# Patient Record
Sex: FEMALE | Race: WHITE | NOT HISPANIC OR LATINO | Employment: OTHER | URBAN - METROPOLITAN AREA
[De-identification: names, ages, dates, MRNs, and addresses within clinical notes are randomized per-mention and may not be internally consistent; named-entity substitution may affect disease eponyms.]

---

## 2023-10-09 ENCOUNTER — LAB REQUISITION (OUTPATIENT)
Dept: LAB | Facility: HOSPITAL | Age: 83
End: 2023-10-09
Payer: MEDICARE

## 2023-10-09 DIAGNOSIS — I10 ESSENTIAL (PRIMARY) HYPERTENSION: ICD-10-CM

## 2023-10-09 DIAGNOSIS — E10.9 TYPE 1 DIABETES MELLITUS WITHOUT COMPLICATIONS (MULTI): ICD-10-CM

## 2023-10-09 LAB
ALBUMIN SERPL BCP-MCNC: 3.5 G/DL (ref 3.4–5)
ALP SERPL-CCNC: 25 U/L (ref 33–136)
ALT SERPL W P-5'-P-CCNC: 12 U/L (ref 7–45)
ANION GAP SERPL CALC-SCNC: 13 MMOL/L (ref 10–20)
AST SERPL W P-5'-P-CCNC: 13 U/L (ref 9–39)
BILIRUB SERPL-MCNC: 0.5 MG/DL (ref 0–1.2)
BUN SERPL-MCNC: 36 MG/DL (ref 6–23)
CALCIUM SERPL-MCNC: 9.8 MG/DL (ref 8.6–10.3)
CHLORIDE SERPL-SCNC: 106 MMOL/L (ref 98–107)
CO2 SERPL-SCNC: 28 MMOL/L (ref 21–32)
CREAT SERPL-MCNC: 1.24 MG/DL (ref 0.5–1.05)
EST. AVERAGE GLUCOSE BLD GHB EST-MCNC: 157 MG/DL
GFR SERPL CREATININE-BSD FRML MDRD: 43 ML/MIN/1.73M*2
GLUCOSE SERPL-MCNC: 138 MG/DL (ref 74–99)
HBA1C MFR BLD: 7.1 %
POTASSIUM SERPL-SCNC: 4.9 MMOL/L (ref 3.5–5.3)
PROT SERPL-MCNC: 6.1 G/DL (ref 6.4–8.2)
SODIUM SERPL-SCNC: 142 MMOL/L (ref 136–145)

## 2023-10-09 PROCEDURE — 83036 HEMOGLOBIN GLYCOSYLATED A1C: CPT | Mod: OUT,CMCLAB,GEALAB | Performed by: REGISTERED NURSE

## 2023-10-09 PROCEDURE — 80053 COMPREHEN METABOLIC PANEL: CPT | Mod: OUT | Performed by: REGISTERED NURSE

## 2023-10-12 ENCOUNTER — LAB REQUISITION (OUTPATIENT)
Dept: LAB | Facility: HOSPITAL | Age: 83
End: 2023-10-12
Payer: MEDICARE

## 2023-10-12 DIAGNOSIS — E78.2 MIXED HYPERLIPIDEMIA: ICD-10-CM

## 2023-10-12 DIAGNOSIS — E10.9 TYPE 1 DIABETES MELLITUS WITHOUT COMPLICATIONS (MULTI): ICD-10-CM

## 2023-10-12 DIAGNOSIS — I10 ESSENTIAL (PRIMARY) HYPERTENSION: ICD-10-CM

## 2023-10-12 LAB
ALBUMIN SERPL BCP-MCNC: 3.7 G/DL (ref 3.4–5)
ALP SERPL-CCNC: 30 U/L (ref 33–136)
ALT SERPL W P-5'-P-CCNC: 12 U/L (ref 7–45)
ANION GAP SERPL CALC-SCNC: 14 MMOL/L (ref 10–20)
AST SERPL W P-5'-P-CCNC: 12 U/L (ref 9–39)
BILIRUB SERPL-MCNC: 0.6 MG/DL (ref 0–1.2)
BUN SERPL-MCNC: 34 MG/DL (ref 6–23)
CALCIUM SERPL-MCNC: 10.4 MG/DL (ref 8.6–10.3)
CHLORIDE SERPL-SCNC: 103 MMOL/L (ref 98–107)
CHOLEST SERPL-MCNC: 118 MG/DL (ref 0–199)
CHOLESTEROL/HDL RATIO: 2.3
CO2 SERPL-SCNC: 26 MMOL/L (ref 21–32)
CREAT SERPL-MCNC: 1.16 MG/DL (ref 0.5–1.05)
EST. AVERAGE GLUCOSE BLD GHB EST-MCNC: 151 MG/DL
GFR SERPL CREATININE-BSD FRML MDRD: 46 ML/MIN/1.73M*2
GLUCOSE SERPL-MCNC: 158 MG/DL (ref 74–99)
HBA1C MFR BLD: 6.9 %
HDLC SERPL-MCNC: 51.8 MG/DL
LDLC SERPL CALC-MCNC: 34 MG/DL
NON HDL CHOLESTEROL: 66 MG/DL (ref 0–149)
POTASSIUM SERPL-SCNC: 4.1 MMOL/L (ref 3.5–5.3)
PROT SERPL-MCNC: 6.7 G/DL (ref 6.4–8.2)
SODIUM SERPL-SCNC: 139 MMOL/L (ref 136–145)
TRIGL SERPL-MCNC: 163 MG/DL (ref 0–149)
VLDL: 33 MG/DL (ref 0–40)

## 2023-10-12 PROCEDURE — 83036 HEMOGLOBIN GLYCOSYLATED A1C: CPT | Mod: OUT,CMCLAB,GEALAB | Performed by: INTERNAL MEDICINE

## 2023-10-12 PROCEDURE — 80061 LIPID PANEL: CPT | Mod: OUT | Performed by: INTERNAL MEDICINE

## 2023-10-12 PROCEDURE — 80053 COMPREHEN METABOLIC PANEL: CPT | Mod: OUT | Performed by: INTERNAL MEDICINE

## 2024-01-23 ENCOUNTER — LAB REQUISITION (OUTPATIENT)
Dept: LAB | Facility: HOSPITAL | Age: 84
End: 2024-01-23
Payer: MEDICARE

## 2024-01-23 DIAGNOSIS — I10 ESSENTIAL (PRIMARY) HYPERTENSION: ICD-10-CM

## 2024-01-23 DIAGNOSIS — E10.9 TYPE 1 DIABETES MELLITUS WITHOUT COMPLICATIONS (MULTI): ICD-10-CM

## 2024-01-23 DIAGNOSIS — E78.2 MIXED HYPERLIPIDEMIA: ICD-10-CM

## 2024-01-23 LAB
ALBUMIN SERPL BCP-MCNC: 3.7 G/DL (ref 3.4–5)
ALP SERPL-CCNC: 29 U/L (ref 33–136)
ALT SERPL W P-5'-P-CCNC: 11 U/L (ref 7–45)
ANION GAP SERPL CALC-SCNC: 11 MMOL/L (ref 10–20)
AST SERPL W P-5'-P-CCNC: 14 U/L (ref 9–39)
BILIRUB SERPL-MCNC: 0.4 MG/DL (ref 0–1.2)
BUN SERPL-MCNC: 26 MG/DL (ref 6–23)
CALCIUM SERPL-MCNC: 10.2 MG/DL (ref 8.6–10.3)
CHLORIDE SERPL-SCNC: 101 MMOL/L (ref 98–107)
CHOLEST SERPL-MCNC: 109 MG/DL (ref 0–199)
CHOLESTEROL/HDL RATIO: 2.1
CO2 SERPL-SCNC: 30 MMOL/L (ref 21–32)
CREAT SERPL-MCNC: 1.11 MG/DL (ref 0.5–1.05)
EGFRCR SERPLBLD CKD-EPI 2021: 49 ML/MIN/1.73M*2
GLUCOSE SERPL-MCNC: 105 MG/DL (ref 74–99)
HDLC SERPL-MCNC: 52.9 MG/DL
LDLC SERPL CALC-MCNC: 36 MG/DL
NON HDL CHOLESTEROL: 56 MG/DL (ref 0–149)
POTASSIUM SERPL-SCNC: 5 MMOL/L (ref 3.5–5.3)
PROT SERPL-MCNC: 6.2 G/DL (ref 6.4–8.2)
SODIUM SERPL-SCNC: 137 MMOL/L (ref 136–145)
TRIGL SERPL-MCNC: 99 MG/DL (ref 0–149)
VLDL: 20 MG/DL (ref 0–40)

## 2024-01-23 PROCEDURE — 80061 LIPID PANEL: CPT | Mod: OUT | Performed by: REGISTERED NURSE

## 2024-01-23 PROCEDURE — 83036 HEMOGLOBIN GLYCOSYLATED A1C: CPT | Mod: OUT,GEALAB | Performed by: REGISTERED NURSE

## 2024-01-23 PROCEDURE — 80053 COMPREHEN METABOLIC PANEL: CPT | Mod: OUT | Performed by: REGISTERED NURSE

## 2024-01-24 LAB
EST. AVERAGE GLUCOSE BLD GHB EST-MCNC: 143 MG/DL
HBA1C MFR BLD: 6.6 %

## 2024-04-15 ENCOUNTER — LAB REQUISITION (OUTPATIENT)
Dept: LAB | Facility: HOSPITAL | Age: 84
End: 2024-04-15
Payer: MEDICARE

## 2024-04-15 DIAGNOSIS — E10.9 TYPE 1 DIABETES MELLITUS WITHOUT COMPLICATIONS (MULTI): ICD-10-CM

## 2024-04-15 DIAGNOSIS — I10 ESSENTIAL (PRIMARY) HYPERTENSION: ICD-10-CM

## 2024-04-15 LAB
ANION GAP SERPL CALC-SCNC: 15 MMOL/L (ref 10–20)
BUN SERPL-MCNC: 31 MG/DL (ref 6–23)
CALCIUM SERPL-MCNC: 10 MG/DL (ref 8.6–10.3)
CHLORIDE SERPL-SCNC: 104 MMOL/L (ref 98–107)
CO2 SERPL-SCNC: 32 MMOL/L (ref 21–32)
CREAT SERPL-MCNC: 1.13 MG/DL (ref 0.5–1.05)
EGFRCR SERPLBLD CKD-EPI 2021: 48 ML/MIN/1.73M*2
GLUCOSE SERPL-MCNC: 125 MG/DL (ref 74–99)
POTASSIUM SERPL-SCNC: 4.8 MMOL/L (ref 3.5–5.3)
SODIUM SERPL-SCNC: 146 MMOL/L (ref 136–145)

## 2024-04-15 PROCEDURE — 83036 HEMOGLOBIN GLYCOSYLATED A1C: CPT | Mod: OUT,GEALAB | Performed by: REGISTERED NURSE

## 2024-04-15 PROCEDURE — 80048 BASIC METABOLIC PNL TOTAL CA: CPT | Mod: OUT | Performed by: REGISTERED NURSE

## 2024-04-16 LAB
EST. AVERAGE GLUCOSE BLD GHB EST-MCNC: 148 MG/DL
HBA1C MFR BLD: 6.8 %

## 2024-07-22 ENCOUNTER — LAB REQUISITION (OUTPATIENT)
Dept: LAB | Facility: HOSPITAL | Age: 84
End: 2024-07-22
Payer: MEDICARE

## 2024-07-22 DIAGNOSIS — I10 ESSENTIAL (PRIMARY) HYPERTENSION: ICD-10-CM

## 2024-07-22 DIAGNOSIS — E10.9 TYPE 1 DIABETES MELLITUS WITHOUT COMPLICATIONS (MULTI): ICD-10-CM

## 2024-07-22 LAB
ANION GAP SERPL CALC-SCNC: 13 MMOL/L (ref 10–20)
BUN SERPL-MCNC: 29 MG/DL (ref 6–23)
CALCIUM SERPL-MCNC: 10.2 MG/DL (ref 8.6–10.3)
CHLORIDE SERPL-SCNC: 102 MMOL/L (ref 98–107)
CO2 SERPL-SCNC: 29 MMOL/L (ref 21–32)
CREAT SERPL-MCNC: 1.13 MG/DL (ref 0.5–1.05)
EGFRCR SERPLBLD CKD-EPI 2021: 47 ML/MIN/1.73M*2
EST. AVERAGE GLUCOSE BLD GHB EST-MCNC: 151 MG/DL
GLUCOSE SERPL-MCNC: 130 MG/DL (ref 74–99)
HBA1C MFR BLD: 6.9 %
POTASSIUM SERPL-SCNC: 4.4 MMOL/L (ref 3.5–5.3)
SODIUM SERPL-SCNC: 140 MMOL/L (ref 136–145)

## 2024-07-22 PROCEDURE — 83036 HEMOGLOBIN GLYCOSYLATED A1C: CPT | Mod: OUT,GEALAB | Performed by: REGISTERED NURSE

## 2024-07-22 PROCEDURE — 80048 BASIC METABOLIC PNL TOTAL CA: CPT | Mod: OUT | Performed by: REGISTERED NURSE

## 2024-08-28 ENCOUNTER — LAB REQUISITION (OUTPATIENT)
Dept: LAB | Facility: HOSPITAL | Age: 84
End: 2024-08-28
Payer: MEDICARE

## 2024-08-28 DIAGNOSIS — I10 ESSENTIAL (PRIMARY) HYPERTENSION: ICD-10-CM

## 2024-08-28 DIAGNOSIS — E10.9 TYPE 1 DIABETES MELLITUS WITHOUT COMPLICATIONS (MULTI): ICD-10-CM

## 2024-08-28 LAB
ALBUMIN SERPL BCP-MCNC: 3.6 G/DL (ref 3.4–5)
ALP SERPL-CCNC: 37 U/L (ref 33–136)
ALT SERPL W P-5'-P-CCNC: 11 U/L (ref 7–45)
ANION GAP SERPL CALC-SCNC: 14 MMOL/L (ref 10–20)
AST SERPL W P-5'-P-CCNC: 17 U/L (ref 9–39)
BILIRUB SERPL-MCNC: 0.6 MG/DL (ref 0–1.2)
BUN SERPL-MCNC: 37 MG/DL (ref 6–23)
CALCIUM SERPL-MCNC: 9.7 MG/DL (ref 8.6–10.3)
CHLORIDE SERPL-SCNC: 101 MMOL/L (ref 98–107)
CO2 SERPL-SCNC: 28 MMOL/L (ref 21–32)
CREAT SERPL-MCNC: 1.48 MG/DL (ref 0.5–1.05)
EGFRCR SERPLBLD CKD-EPI 2021: 34 ML/MIN/1.73M*2
EST. AVERAGE GLUCOSE BLD GHB EST-MCNC: 151 MG/DL
GLUCOSE SERPL-MCNC: 145 MG/DL (ref 74–99)
HBA1C MFR BLD: 6.9 %
POTASSIUM SERPL-SCNC: 4.7 MMOL/L (ref 3.5–5.3)
PROT SERPL-MCNC: 6.5 G/DL (ref 6.4–8.2)
SODIUM SERPL-SCNC: 138 MMOL/L (ref 136–145)

## 2024-08-28 PROCEDURE — 83036 HEMOGLOBIN GLYCOSYLATED A1C: CPT | Mod: OUT,GEALAB | Performed by: REGISTERED NURSE

## 2024-08-28 PROCEDURE — 80053 COMPREHEN METABOLIC PANEL: CPT | Mod: OUT | Performed by: REGISTERED NURSE

## 2024-09-12 ENCOUNTER — HOSPITAL ENCOUNTER (EMERGENCY)
Facility: HOSPITAL | Age: 84
Discharge: NURSING, REHAB OR LONG-TERM CARE | End: 2024-09-13
Attending: EMERGENCY MEDICINE
Payer: MEDICARE

## 2024-09-12 ENCOUNTER — APPOINTMENT (OUTPATIENT)
Dept: RADIOLOGY | Facility: HOSPITAL | Age: 84
End: 2024-09-12
Payer: MEDICARE

## 2024-09-12 VITALS
SYSTOLIC BLOOD PRESSURE: 176 MMHG | BODY MASS INDEX: 31.54 KG/M2 | RESPIRATION RATE: 16 BRPM | DIASTOLIC BLOOD PRESSURE: 72 MMHG | TEMPERATURE: 97.5 F | WEIGHT: 178 LBS | OXYGEN SATURATION: 100 % | HEART RATE: 85 BPM | HEIGHT: 63 IN

## 2024-09-12 DIAGNOSIS — S89.91XA INJURY OF RIGHT KNEE, INITIAL ENCOUNTER: Primary | ICD-10-CM

## 2024-09-12 DIAGNOSIS — N30.00 ACUTE CYSTITIS WITHOUT HEMATURIA: ICD-10-CM

## 2024-09-12 LAB
AMORPH CRY #/AREA UR COMP ASSIST: ABNORMAL /HPF
APPEARANCE UR: ABNORMAL
BACTERIA #/AREA URNS AUTO: ABNORMAL /HPF
BILIRUB UR STRIP.AUTO-MCNC: NEGATIVE MG/DL
COLOR UR: ABNORMAL
GLUCOSE UR STRIP.AUTO-MCNC: NORMAL MG/DL
KETONES UR STRIP.AUTO-MCNC: NEGATIVE MG/DL
LEUKOCYTE ESTERASE UR QL STRIP.AUTO: ABNORMAL
NITRITE UR QL STRIP.AUTO: NEGATIVE
PH UR STRIP.AUTO: 7 [PH]
PROT UR STRIP.AUTO-MCNC: ABNORMAL MG/DL
RBC # UR STRIP.AUTO: ABNORMAL /UL
RBC #/AREA URNS AUTO: ABNORMAL /HPF
SP GR UR STRIP.AUTO: 1.02
UROBILINOGEN UR STRIP.AUTO-MCNC: NORMAL MG/DL
WBC #/AREA URNS AUTO: >50 /HPF
WBC CLUMPS #/AREA URNS AUTO: ABNORMAL /HPF
YEAST BUDDING #/AREA UR COMP ASSIST: PRESENT /HPF

## 2024-09-12 PROCEDURE — 73700 CT LOWER EXTREMITY W/O DYE: CPT | Mod: RT

## 2024-09-12 PROCEDURE — 73590 X-RAY EXAM OF LOWER LEG: CPT | Mod: RT

## 2024-09-12 PROCEDURE — 73564 X-RAY EXAM KNEE 4 OR MORE: CPT | Mod: RIGHT SIDE | Performed by: RADIOLOGY

## 2024-09-12 PROCEDURE — 87086 URINE CULTURE/COLONY COUNT: CPT | Mod: GEALAB

## 2024-09-12 PROCEDURE — 73700 CT LOWER EXTREMITY W/O DYE: CPT | Mod: RIGHT SIDE | Performed by: STUDENT IN AN ORGANIZED HEALTH CARE EDUCATION/TRAINING PROGRAM

## 2024-09-12 PROCEDURE — 72170 X-RAY EXAM OF PELVIS: CPT

## 2024-09-12 PROCEDURE — 73552 X-RAY EXAM OF FEMUR 2/>: CPT | Mod: RT

## 2024-09-12 PROCEDURE — 73564 X-RAY EXAM KNEE 4 OR MORE: CPT | Mod: RT

## 2024-09-12 PROCEDURE — 73552 X-RAY EXAM OF FEMUR 2/>: CPT | Mod: RIGHT SIDE | Performed by: RADIOLOGY

## 2024-09-12 PROCEDURE — 2500000001 HC RX 250 WO HCPCS SELF ADMINISTERED DRUGS (ALT 637 FOR MEDICARE OP)

## 2024-09-12 PROCEDURE — 73590 X-RAY EXAM OF LOWER LEG: CPT | Mod: RIGHT SIDE | Performed by: RADIOLOGY

## 2024-09-12 PROCEDURE — 81001 URINALYSIS AUTO W/SCOPE: CPT

## 2024-09-12 PROCEDURE — 72170 X-RAY EXAM OF PELVIS: CPT | Mod: RIGHT SIDE | Performed by: RADIOLOGY

## 2024-09-12 PROCEDURE — 99284 EMERGENCY DEPT VISIT MOD MDM: CPT | Mod: 25

## 2024-09-12 RX ORDER — ACETAMINOPHEN 325 MG/1
650 TABLET ORAL ONCE
Status: COMPLETED | OUTPATIENT
Start: 2024-09-12 | End: 2024-09-12

## 2024-09-12 RX ORDER — CEPHALEXIN 500 MG/1
500 CAPSULE ORAL 2 TIMES DAILY
Qty: 10 CAPSULE | Refills: 0 | Status: SHIPPED | OUTPATIENT
Start: 2024-09-12 | End: 2024-09-17

## 2024-09-12 RX ORDER — CEPHALEXIN 500 MG/1
500 CAPSULE ORAL ONCE
Status: COMPLETED | OUTPATIENT
Start: 2024-09-12 | End: 2024-09-12

## 2024-09-12 ASSESSMENT — COLUMBIA-SUICIDE SEVERITY RATING SCALE - C-SSRS
2. HAVE YOU ACTUALLY HAD ANY THOUGHTS OF KILLING YOURSELF?: NO
1. IN THE PAST MONTH, HAVE YOU WISHED YOU WERE DEAD OR WISHED YOU COULD GO TO SLEEP AND NOT WAKE UP?: NO
6. HAVE YOU EVER DONE ANYTHING, STARTED TO DO ANYTHING, OR PREPARED TO DO ANYTHING TO END YOUR LIFE?: NO

## 2024-09-12 ASSESSMENT — PAIN SCALES - GENERAL: PAINLEVEL_OUTOF10: 8

## 2024-09-12 ASSESSMENT — LIFESTYLE VARIABLES
HAVE PEOPLE ANNOYED YOU BY CRITICIZING YOUR DRINKING: NO
EVER HAD A DRINK FIRST THING IN THE MORNING TO STEADY YOUR NERVES TO GET RID OF A HANGOVER: NO
EVER FELT BAD OR GUILTY ABOUT YOUR DRINKING: NO
HAVE YOU EVER FELT YOU SHOULD CUT DOWN ON YOUR DRINKING: NO
TOTAL SCORE: 0

## 2024-09-12 ASSESSMENT — PAIN DESCRIPTION - ORIENTATION: ORIENTATION: RIGHT

## 2024-09-12 ASSESSMENT — PAIN - FUNCTIONAL ASSESSMENT: PAIN_FUNCTIONAL_ASSESSMENT: 0-10

## 2024-09-12 ASSESSMENT — PAIN DESCRIPTION - LOCATION: LOCATION: LEG

## 2024-09-12 ASSESSMENT — PAIN DESCRIPTION - PAIN TYPE: TYPE: ACUTE PAIN

## 2024-09-12 NOTE — PROGRESS NOTES
The patient was seen by the midlevel/resident.  I have personally saw the patient and made/approved the management plan and take responsibility for the patient management.  I reviewed the EKG's (when done) and agree with the interpretation.  I have seen and examined the patient; agree with the workup, evaluation, MDM, and diagnosis.  The care plan has been discussed with the midlevel/resident; I have reviewed the note and agree with the documented findings.     Patient is complaining of right knee discomfort since a fall a week ago.  She had previous x-rays done at an outpatient facility that were negative per report.  We could not see the results.  X-rays here were performed and on my exam her pain seems to be focused on her knee she has fair range of motion at her hip although it does cause some knee pain.  Plan is to do a CT of the knee.  Not finding signs of hip fracture.  Patient may need further outpatient workup if etiology is not found.  CT did not show fracture patient stable be discharged encouraged to follow-up as outpatient.  I do not believe there is a hip etiology but we did discuss this as a possibility.  ED Course as of 09/12/24 1837   Thu Sep 12, 2024   1539 X-ray of the right hip, femur, knee and tib-fib showing no acute fracture or dislocation.  Will obtain CT to rule out occult fracture. [KR]   1759 Urinalysis with Reflex Culture and Microscopic(!)  Patient endorsing dark urine, will treat with course of Keflex, culture sent.  No signs of systemic infection, no flank tenderness or fever. [KR]   1812 CT knee right wo IV contrast  No evidence of tibial plateau fracture, patient with osteoarthritis noted.  Trace knee joint effusion. [KR]      ED Course User Index  [KR] Ivone Andres DO         Diagnoses as of 09/12/24 1837   Injury of right knee, initial encounter   Acute cystitis without hematuria     Kal Arzate MD

## 2024-09-12 NOTE — DISCHARGE INSTRUCTIONS
You were seen after a fall for knee pain.  Your x-rays and CT did not show any fractures however you may still have some ligament or meniscus injury.  We have sent referral for orthopedics.  We sent you a prescription for urinary tract infection.

## 2024-09-12 NOTE — ED TRIAGE NOTES
'patient from [facility] with c/o increased right knee pain difficulty ambulating after slipping out of a chair a week ago and landing on right knee. Patient had xrays done of knee which  came back negative. Patient reporting increased pain and unable to use her walker. Patient is A&Ox4

## 2024-09-13 LAB — HOLD SPECIMEN: NORMAL

## 2024-09-15 LAB — BACTERIA UR CULT: ABNORMAL

## 2024-09-17 LAB — BACTERIA UR CULT: ABNORMAL

## 2024-09-19 ENCOUNTER — TELEPHONE (OUTPATIENT)
Dept: PHARMACY | Facility: HOSPITAL | Age: 84
End: 2024-09-19
Payer: MEDICARE

## 2024-09-19 NOTE — PROGRESS NOTES
EDPD Note: Lab/Chart Reviewed    Reviewed Ms. Donna J Gonda 's chart regarding a positive urine culture that was taken during their recent emergency room visit. The patient was transferred back to their rehab/LTC facility .Therefore, I have faxed this information to [facility] at fax number [phone] .    Susceptibility data from last 90 days.  Collected Specimen Info Organism Amoxicillin/Clavulanate Ampicillin Ampicillin/Sulbactam Cefazolin Cefazolin (uncomplicated UTIs only) Ceftriaxone Ciprofloxacin Gentamicin Nitrofurantoin Piperacillin/Tazobactam Tobramycin   09/12/24 Urine from Clean Catch/Voided Escherichia coli  I  R  R  R  S  S  R  R  S  S  S     Collected Specimen Info Organism Trimethoprim/Sulfamethoxazole   09/12/24 Urine from Clean Catch/Voided Escherichia coli  S       No further follow up needed from EDPD Team.     Juanpablo Romero RPh

## 2024-10-07 ENCOUNTER — HOSPITAL ENCOUNTER (OUTPATIENT)
Dept: RADIOLOGY | Facility: HOSPITAL | Age: 84
Discharge: HOME | End: 2024-10-07
Payer: MEDICARE

## 2024-10-07 DIAGNOSIS — M89.8X5 OTHER SPECIFIED DISORDERS OF BONE, THIGH: ICD-10-CM

## 2024-10-07 PROCEDURE — 73718 MRI LOWER EXTREMITY W/O DYE: CPT | Mod: RIGHT SIDE | Performed by: STUDENT IN AN ORGANIZED HEALTH CARE EDUCATION/TRAINING PROGRAM

## 2024-10-07 PROCEDURE — 73718 MRI LOWER EXTREMITY W/O DYE: CPT | Mod: RT

## 2024-10-15 ENCOUNTER — OFFICE VISIT (OUTPATIENT)
Dept: ORTHOPEDIC SURGERY | Facility: CLINIC | Age: 84
End: 2024-10-15
Payer: MEDICARE

## 2024-10-15 VITALS — WEIGHT: 178 LBS | HEIGHT: 63 IN | BODY MASS INDEX: 31.54 KG/M2

## 2024-10-15 DIAGNOSIS — S89.91XA INJURY OF RIGHT KNEE, INITIAL ENCOUNTER: ICD-10-CM

## 2024-10-15 PROCEDURE — 99213 OFFICE O/P EST LOW 20 MIN: CPT | Performed by: PHYSICIAN ASSISTANT

## 2024-10-15 RX ORDER — METFORMIN HYDROCHLORIDE 500 MG/1
TABLET ORAL
COMMUNITY
Start: 2024-07-28

## 2024-10-15 RX ORDER — VALSARTAN AND HYDROCHLOROTHIAZIDE 160; 12.5 MG/1; MG/1
TABLET, FILM COATED ORAL
COMMUNITY
Start: 2023-04-25

## 2024-10-15 RX ORDER — GABAPENTIN 300 MG/1
1 CAPSULE ORAL EVERY 12 HOURS
COMMUNITY
Start: 2024-08-26

## 2024-10-15 RX ORDER — APIXABAN 5 MG/1
5 TABLET, FILM COATED ORAL 2 TIMES DAILY
COMMUNITY
Start: 2023-05-25

## 2024-10-15 RX ORDER — LOVASTATIN 20 MG/1
20 TABLET ORAL DAILY
COMMUNITY
Start: 2023-04-25

## 2024-10-15 RX ORDER — SEMAGLUTIDE 0.68 MG/ML
INJECTION, SOLUTION SUBCUTANEOUS
COMMUNITY
Start: 2024-04-07

## 2024-10-15 RX ORDER — FAMOTIDINE 20 MG/1
TABLET, FILM COATED ORAL 2 TIMES DAILY
COMMUNITY
Start: 2024-07-30

## 2024-10-15 RX ORDER — METOPROLOL SUCCINATE 50 MG/1
TABLET, EXTENDED RELEASE ORAL
COMMUNITY
Start: 2019-07-30

## 2024-10-15 RX ORDER — ACETAMINOPHEN 500 MG/1
CAPSULE, LIQUID FILLED ORAL EVERY 8 HOURS PRN
COMMUNITY

## 2024-10-15 ASSESSMENT — PAIN - FUNCTIONAL ASSESSMENT: PAIN_FUNCTIONAL_ASSESSMENT: 0-10

## 2024-10-15 ASSESSMENT — PAIN DESCRIPTION - DESCRIPTORS: DESCRIPTORS: ACHING

## 2024-10-15 ASSESSMENT — PAIN SCALES - GENERAL: PAINLEVEL_OUTOF10: 7

## 2024-10-16 NOTE — PROGRESS NOTES
History of Present Illness  Donna J Gonda is a 84 y.o.s female presenting for evaluation of right knee pain after a fall about one month ago. Pt was walking when she slipped and the right leg folded under her left. Had immediate pain in the knee. Since then she has had continued moderate pain in the knee that is diffuse throughout. Sx increased with WB and improved with rest. Has had no swelling, redness, or increased warmth. Has been taking OTC medications without relief. No other complaints or concerns.      History reviewed. No pertinent past medical history.    Medication Documentation Review Audit       Reviewed by Kai Wayne on 10/15/24 at 1425      Medication Order Taking? Sig Documenting Provider Last Dose Status   acetaminophen (Tylenol) 500 mg capsule [id number]  Take by mouth every 8 hours if needed. Historical Provider, MD  Active   Eliquis 5 mg tablet [id number] Yes Take 1 tablet (5 mg) by mouth 2 times a day. Historical Provider, MD  Active   famotidine (Pepcid) 20 mg tablet [id number] Yes Take by mouth twice a day. Historical Provider, MD  Active   gabapentin (Neurontin) 300 mg capsule [id number] Yes 1 capsule (300 mg) every 12 hours. Historical Provider, MD  Active   lovastatin (Mevacor) 20 mg tablet [id number] Yes Take 1 tablet (20 mg) by mouth once daily. Historical Provider, MD  Active   metFORMIN (Glucophage) 500 mg tablet [id number] Yes  Historical Provider, MD  Active   metoprolol succinate XL (Toprol-XL) 50 mg 24 hr tablet [id number] Yes  Historical Provider, MD  Active   Ozempic 0.25 mg or 0.5 mg (2 mg/3 mL) pen injector [id number] Yes  Historical Provider, MD  Active   valsartan-hydrochlorothiazide (Diovan-HCT) 160-12.5 mg tablet [id number] Yes  Historical Provider, MD  Active                    No Known Allergies    Social History     Socioeconomic History    Marital status: Single     Spouse name: Not on file    Number of children: Not on file    Years of education: Not on file    Highest  education level: Not on file   Occupational History    Not on file   Tobacco Use    Smoking status: Not on file    Smokeless tobacco: Not on file   Substance and Sexual Activity    Alcohol use: Not on file    Drug use: Not on file    Sexual activity: Not on file   Other Topics Concern    Not on file   Social History Narrative    Not on file     Social Drivers of Health     Financial Resource Strain: Not on file   Food Insecurity: Not on file   Transportation Needs: Not on file   Physical Activity: Not on file   Stress: Not on file   Social Connections: Not on file   Intimate Partner Violence: Not on file   Housing Stability: Not on file       History reviewed. No pertinent surgical history.     Review of Systems   30 point ROS reviewed and negative other than as listed in the HPI.      Exam  Gen: The pt is A&Ox3, NAD, and appear state age and weight  Psychiatric: mood and affect are appropriate   Eyes: sclera are white, EOM grossly intact  ENT: MMM  Neck: supple, thyroid is midline  Respiratory: respirations are nonlabored, chest rise symmetric  CV: rate is regular by palpation of distal pulses  Abdomen: nondistended   Integument: no obvious cutaneous lesions noted. No signs of lymphangitis. No signs of systemic edema.   MSK:  Musculoskeletal examination of the right knee demonstrate no abrasions and no effusion. Knee range of motion is painful but demonstrates full extension to beyond 90° of flexion. The knee is stable to varus and valgus stress at zero and 30° of flexion. Lachman's test and posterior drawer are negative. Medial and lateral Kimberly's tests are negative. Patellar apprehension is negative. Patellar grind test is negative. Sensation is intact to light touch in the tibial, sural, saphenous, superficial peroneal, and deep peroneal nerve distributions. Foot is warm and well-perfused.     Imaging:  I personally reviewed multiple views of the  R knee  including XR and CT and notable for advanced  degenerative changes but no acute fx or dislocation     Assessment:  R knee sprain; DJD    Plan:  I reviewed her imaging in detail with her. She has signs of arthritis on imaging but no acute fx or dislocation. Her exam is not concerning for ligamentous injury. I think at this time she would benefit from a cortisone shot, but due to her body habitus would best be treated with US guided. She has a provider she see routinely for back injections and will reach out about a knee injection, but could also see one of our sports medicine physicians. She is in PT and will continue with her rehab exercises. No further follow up with me needed; can follow up with joints specialist as needed.   Natural history reviewed. All of the pt questions/concerns addressed and they are in agreement with the plan.

## 2024-10-29 ENCOUNTER — APPOINTMENT (OUTPATIENT)
Dept: ORTHOPEDIC SURGERY | Facility: CLINIC | Age: 84
End: 2024-10-29

## 2025-01-02 ENCOUNTER — LAB REQUISITION (OUTPATIENT)
Dept: LAB | Facility: HOSPITAL | Age: 85
End: 2025-01-02
Payer: MEDICARE

## 2025-01-02 DIAGNOSIS — D64.9 ANEMIA, UNSPECIFIED: ICD-10-CM

## 2025-01-02 DIAGNOSIS — I10 ESSENTIAL (PRIMARY) HYPERTENSION: ICD-10-CM

## 2025-01-02 DIAGNOSIS — E78.2 MIXED HYPERLIPIDEMIA: ICD-10-CM

## 2025-01-02 LAB
ANION GAP SERPL CALC-SCNC: 13 MMOL/L (ref 10–20)
BUN SERPL-MCNC: 33 MG/DL (ref 6–23)
CALCIUM SERPL-MCNC: 9.9 MG/DL (ref 8.6–10.3)
CHLORIDE SERPL-SCNC: 102 MMOL/L (ref 98–107)
CHOLEST SERPL-MCNC: 127 MG/DL (ref 0–199)
CHOLESTEROL/HDL RATIO: 2.2
CO2 SERPL-SCNC: 29 MMOL/L (ref 21–32)
CREAT SERPL-MCNC: 1.2 MG/DL (ref 0.5–1.05)
EGFRCR SERPLBLD CKD-EPI 2021: 44 ML/MIN/1.73M*2
ERYTHROCYTE [DISTWIDTH] IN BLOOD BY AUTOMATED COUNT: 13.1 % (ref 11.5–14.5)
GLUCOSE SERPL-MCNC: 105 MG/DL (ref 74–99)
HCT VFR BLD AUTO: 33.7 % (ref 36–46)
HDLC SERPL-MCNC: 58.3 MG/DL
HGB BLD-MCNC: 10.6 G/DL (ref 12–16)
LDLC SERPL CALC-MCNC: 53 MG/DL
MCH RBC QN AUTO: 30.2 PG (ref 26–34)
MCHC RBC AUTO-ENTMCNC: 31.5 G/DL (ref 32–36)
MCV RBC AUTO: 96 FL (ref 80–100)
NON HDL CHOLESTEROL: 69 MG/DL (ref 0–149)
NRBC BLD-RTO: 0 /100 WBCS (ref 0–0)
PLATELET # BLD AUTO: 219 X10*3/UL (ref 150–450)
POTASSIUM SERPL-SCNC: 4.7 MMOL/L (ref 3.5–5.3)
RBC # BLD AUTO: 3.51 X10*6/UL (ref 4–5.2)
SODIUM SERPL-SCNC: 139 MMOL/L (ref 136–145)
TRIGL SERPL-MCNC: 78 MG/DL (ref 0–149)
VLDL: 16 MG/DL (ref 0–40)
WBC # BLD AUTO: 5.8 X10*3/UL (ref 4.4–11.3)

## 2025-01-02 PROCEDURE — 85027 COMPLETE CBC AUTOMATED: CPT | Mod: OUT | Performed by: REGISTERED NURSE

## 2025-01-02 PROCEDURE — 82947 ASSAY GLUCOSE BLOOD QUANT: CPT | Mod: OUT | Performed by: REGISTERED NURSE

## 2025-01-02 PROCEDURE — 84478 ASSAY OF TRIGLYCERIDES: CPT | Mod: OUT | Performed by: REGISTERED NURSE

## 2025-01-10 ENCOUNTER — LAB REQUISITION (OUTPATIENT)
Dept: LAB | Facility: HOSPITAL | Age: 85
DRG: 444 | End: 2025-01-10
Payer: MEDICARE

## 2025-01-10 DIAGNOSIS — I10 ESSENTIAL (PRIMARY) HYPERTENSION: ICD-10-CM

## 2025-01-10 DIAGNOSIS — I50.9 HEART FAILURE, UNSPECIFIED: ICD-10-CM

## 2025-01-10 DIAGNOSIS — N39.0 URINARY TRACT INFECTION, SITE NOT SPECIFIED: ICD-10-CM

## 2025-01-10 DIAGNOSIS — E83.09: ICD-10-CM

## 2025-01-10 DIAGNOSIS — E83.40 DISORDERS OF MAGNESIUM METABOLISM, UNSPECIFIED: ICD-10-CM

## 2025-01-10 LAB
ANION GAP SERPL CALC-SCNC: 13 MMOL/L (ref 10–20)
APPEARANCE UR: CLEAR
BILIRUB UR STRIP.AUTO-MCNC: NEGATIVE MG/DL
BNP SERPL-MCNC: 134 PG/ML (ref 0–99)
BUN SERPL-MCNC: 29 MG/DL (ref 6–23)
CALCIUM SERPL-MCNC: 9.9 MG/DL (ref 8.6–10.3)
CHLORIDE SERPL-SCNC: 102 MMOL/L (ref 98–107)
CO2 SERPL-SCNC: 30 MMOL/L (ref 21–32)
COLOR UR: COLORLESS
CREAT SERPL-MCNC: 1.08 MG/DL (ref 0.5–1.05)
EGFRCR SERPLBLD CKD-EPI 2021: 50 ML/MIN/1.73M*2
GLUCOSE SERPL-MCNC: 102 MG/DL (ref 74–99)
GLUCOSE UR STRIP.AUTO-MCNC: NORMAL MG/DL
KETONES UR STRIP.AUTO-MCNC: NEGATIVE MG/DL
LEUKOCYTE ESTERASE UR QL STRIP.AUTO: NEGATIVE
MAGNESIUM SERPL-MCNC: 1.8 MG/DL (ref 1.6–2.4)
NITRITE UR QL STRIP.AUTO: NEGATIVE
PH UR STRIP.AUTO: 7 [PH]
PHOSPHATE SERPL-MCNC: 3.8 MG/DL (ref 2.5–4.9)
POTASSIUM SERPL-SCNC: 4.3 MMOL/L (ref 3.5–5.3)
PROT UR STRIP.AUTO-MCNC: NEGATIVE MG/DL
RBC # UR STRIP.AUTO: NEGATIVE /UL
SODIUM SERPL-SCNC: 141 MMOL/L (ref 136–145)
SP GR UR STRIP.AUTO: 1.01
UROBILINOGEN UR STRIP.AUTO-MCNC: NORMAL MG/DL

## 2025-01-10 PROCEDURE — 83735 ASSAY OF MAGNESIUM: CPT | Mod: OUT | Performed by: REGISTERED NURSE

## 2025-01-10 PROCEDURE — 83880 ASSAY OF NATRIURETIC PEPTIDE: CPT | Mod: OUT | Performed by: REGISTERED NURSE

## 2025-01-10 PROCEDURE — 87086 URINE CULTURE/COLONY COUNT: CPT | Mod: OUT,GEALAB | Performed by: REGISTERED NURSE

## 2025-01-10 PROCEDURE — 80048 BASIC METABOLIC PNL TOTAL CA: CPT | Mod: OUT | Performed by: REGISTERED NURSE

## 2025-01-10 PROCEDURE — 81003 URINALYSIS AUTO W/O SCOPE: CPT | Mod: OUT | Performed by: REGISTERED NURSE

## 2025-01-10 PROCEDURE — 84100 ASSAY OF PHOSPHORUS: CPT | Mod: OUT | Performed by: REGISTERED NURSE

## 2025-01-11 ENCOUNTER — APPOINTMENT (OUTPATIENT)
Dept: RADIOLOGY | Facility: HOSPITAL | Age: 85
DRG: 444 | End: 2025-01-11
Payer: MEDICARE

## 2025-01-11 ENCOUNTER — HOSPITAL ENCOUNTER (INPATIENT)
Facility: HOSPITAL | Age: 85
End: 2025-01-11
Attending: STUDENT IN AN ORGANIZED HEALTH CARE EDUCATION/TRAINING PROGRAM | Admitting: INTERNAL MEDICINE
Payer: MEDICARE

## 2025-01-11 ENCOUNTER — APPOINTMENT (OUTPATIENT)
Dept: CARDIOLOGY | Facility: HOSPITAL | Age: 85
DRG: 444 | End: 2025-01-11
Payer: MEDICARE

## 2025-01-11 DIAGNOSIS — R41.82 ALTERED MENTAL STATUS, UNSPECIFIED ALTERED MENTAL STATUS TYPE: Primary | ICD-10-CM

## 2025-01-11 DIAGNOSIS — N17.9 AKI (ACUTE KIDNEY INJURY) (CMS-HCC): ICD-10-CM

## 2025-01-11 DIAGNOSIS — L03.90 CELLULITIS, UNSPECIFIED CELLULITIS SITE: ICD-10-CM

## 2025-01-11 DIAGNOSIS — K83.8 COMMON BILE DUCT DILATION: ICD-10-CM

## 2025-01-11 PROBLEM — N18.32 CHRONIC KIDNEY DISEASE, STAGE 3B (MULTI): Status: ACTIVE | Noted: 2025-01-11

## 2025-01-11 PROBLEM — M46.1 SACROILIITIS (CMS-HCC): Status: ACTIVE | Noted: 2023-08-14

## 2025-01-11 PROBLEM — M51.17 INTERVERTEBRAL DISC DISORDER WITH RADICULOPATHY OF LUMBOSACRAL REGION: Status: ACTIVE | Noted: 2024-01-04

## 2025-01-11 PROBLEM — E78.5 HYPERLIPIDEMIA: Status: ACTIVE | Noted: 2025-01-11

## 2025-01-11 PROBLEM — I82.409 DVT (DEEP VENOUS THROMBOSIS) (MULTI): Status: ACTIVE | Noted: 2025-01-11

## 2025-01-11 PROBLEM — M51.16 DISPLACEMENT OF LUMBAR INTERVERTEBRAL DISC WITH RADICULOPATHY: Status: ACTIVE | Noted: 2023-06-08

## 2025-01-11 PROBLEM — G89.29 CHRONIC BILATERAL LOW BACK PAIN WITH LEFT-SIDED SCIATICA: Status: ACTIVE | Noted: 2023-06-08

## 2025-01-11 PROBLEM — E66.01 MORBID (SEVERE) OBESITY DUE TO EXCESS CALORIES (MULTI): Status: ACTIVE | Noted: 2025-01-11

## 2025-01-11 PROBLEM — M48.062 SPINAL STENOSIS, LUMBAR REGION, WITH NEUROGENIC CLAUDICATION: Status: ACTIVE | Noted: 2023-06-08

## 2025-01-11 PROBLEM — M54.42 CHRONIC BILATERAL LOW BACK PAIN WITH LEFT-SIDED SCIATICA: Status: ACTIVE | Noted: 2023-06-08

## 2025-01-11 PROBLEM — E11.22 TYPE 2 DIABETES MELLITUS WITH DIABETIC CHRONIC KIDNEY DISEASE: Status: ACTIVE | Noted: 2025-01-11

## 2025-01-11 LAB
ALBUMIN SERPL BCP-MCNC: 3 G/DL (ref 3.4–5)
ALBUMIN SERPL BCP-MCNC: 3.3 G/DL (ref 3.4–5)
ALP SERPL-CCNC: 44 U/L (ref 33–136)
ALP SERPL-CCNC: 52 U/L (ref 33–136)
ALT SERPL W P-5'-P-CCNC: 14 U/L (ref 7–45)
ALT SERPL W P-5'-P-CCNC: 17 U/L (ref 7–45)
AMMONIA PLAS-SCNC: 16 UMOL/L (ref 16–53)
AMPHETAMINES UR QL SCN: NORMAL
AMYLASE SERPL-CCNC: 36 U/L (ref 29–103)
ANION GAP SERPL CALC-SCNC: 12 MMOL/L (ref 10–20)
ANION GAP SERPL CALC-SCNC: 14 MMOL/L (ref 10–20)
APAP SERPL-MCNC: <10 UG/ML
APPEARANCE UR: CLEAR
AST SERPL W P-5'-P-CCNC: 20 U/L (ref 9–39)
AST SERPL W P-5'-P-CCNC: 24 U/L (ref 9–39)
BARBITURATES UR QL SCN: NORMAL
BASE EXCESS BLDV CALC-SCNC: 6.6 MMOL/L (ref -2–3)
BASOPHILS # BLD AUTO: 0.04 X10*3/UL (ref 0–0.1)
BASOPHILS NFR BLD AUTO: 0.6 %
BENZODIAZ UR QL SCN: NORMAL
BILIRUB SERPL-MCNC: 0.4 MG/DL (ref 0–1.2)
BILIRUB SERPL-MCNC: 0.5 MG/DL (ref 0–1.2)
BILIRUB UR STRIP.AUTO-MCNC: NEGATIVE MG/DL
BNP SERPL-MCNC: 51 PG/ML (ref 0–99)
BODY TEMPERATURE: ABNORMAL
BUN SERPL-MCNC: 31 MG/DL (ref 6–23)
BUN SERPL-MCNC: 36 MG/DL (ref 6–23)
BZE UR QL SCN: NORMAL
CALCIUM SERPL-MCNC: 9.2 MG/DL (ref 8.6–10.3)
CALCIUM SERPL-MCNC: 9.7 MG/DL (ref 8.6–10.3)
CANNABINOIDS UR QL SCN: NORMAL
CARDIAC TROPONIN I PNL SERPL HS: 12 NG/L (ref 0–13)
CARDIAC TROPONIN I PNL SERPL HS: 9 NG/L (ref 0–13)
CHLORIDE SERPL-SCNC: 98 MMOL/L (ref 98–107)
CHLORIDE SERPL-SCNC: 99 MMOL/L (ref 98–107)
CK SERPL-CCNC: 192 U/L (ref 0–215)
CO2 SERPL-SCNC: 30 MMOL/L (ref 21–32)
CO2 SERPL-SCNC: 31 MMOL/L (ref 21–32)
COLOR UR: COLORLESS
CREAT SERPL-MCNC: 1.24 MG/DL (ref 0.5–1.05)
CREAT SERPL-MCNC: 1.35 MG/DL (ref 0.5–1.05)
CRP SERPL-MCNC: 6.96 MG/DL
CRP SERPL-MCNC: 8.29 MG/DL
EGFRCR SERPLBLD CKD-EPI 2021: 38 ML/MIN/1.73M*2
EGFRCR SERPLBLD CKD-EPI 2021: 42 ML/MIN/1.73M*2
EOSINOPHIL # BLD AUTO: 0.2 X10*3/UL (ref 0–0.4)
EOSINOPHIL NFR BLD AUTO: 2.9 %
ERYTHROCYTE [DISTWIDTH] IN BLOOD BY AUTOMATED COUNT: 13 % (ref 11.5–14.5)
ERYTHROCYTE [DISTWIDTH] IN BLOOD BY AUTOMATED COUNT: 13.2 % (ref 11.5–14.5)
ERYTHROCYTE [SEDIMENTATION RATE] IN BLOOD BY WESTERGREN METHOD: 66 MM/H (ref 0–30)
ETHANOL SERPL-MCNC: <10 MG/DL
FENTANYL+NORFENTANYL UR QL SCN: NORMAL
FERRITIN SERPL-MCNC: 48 NG/ML (ref 8–150)
FOLATE SERPL-MCNC: 11.8 NG/ML
FOLATE SERPL-MCNC: 12 NG/ML
GLUCOSE BLD MANUAL STRIP-MCNC: 129 MG/DL (ref 74–99)
GLUCOSE BLD MANUAL STRIP-MCNC: 186 MG/DL (ref 74–99)
GLUCOSE SERPL-MCNC: 137 MG/DL (ref 74–99)
GLUCOSE SERPL-MCNC: 198 MG/DL (ref 74–99)
GLUCOSE UR STRIP.AUTO-MCNC: NORMAL MG/DL
HCO3 BLDV-SCNC: 31.9 MMOL/L (ref 22–26)
HCT VFR BLD AUTO: 26.6 % (ref 36–46)
HCT VFR BLD AUTO: 30.6 % (ref 36–46)
HCT VFR BLD AUTO: 32.3 % (ref 36–46)
HGB BLD-MCNC: 10.3 G/DL (ref 12–16)
HGB BLD-MCNC: 8.8 G/DL (ref 12–16)
HGB BLD-MCNC: 9.7 G/DL (ref 12–16)
HGB RETIC QN: 27 PG (ref 28–38)
IMM GRANULOCYTES # BLD AUTO: 0.01 X10*3/UL (ref 0–0.5)
IMM GRANULOCYTES NFR BLD AUTO: 0.1 % (ref 0–0.9)
IMMATURE RETIC FRACTION: 8.7 %
INHALED O2 CONCENTRATION: 21 %
IRON SATN MFR SERPL: 14 % (ref 25–45)
IRON SERPL-MCNC: 31 UG/DL (ref 35–150)
KETONES UR STRIP.AUTO-MCNC: NEGATIVE MG/DL
LACTATE SERPL-SCNC: 1.5 MMOL/L (ref 0.4–2)
LEUKOCYTE ESTERASE UR QL STRIP.AUTO: NEGATIVE
LIPASE SERPL-CCNC: 87 U/L (ref 9–82)
LYMPHOCYTES # BLD AUTO: 2.03 X10*3/UL (ref 0.8–3)
LYMPHOCYTES NFR BLD AUTO: 29.9 %
MAGNESIUM SERPL-MCNC: 1.65 MG/DL (ref 1.6–2.4)
MCH RBC QN AUTO: 29.6 PG (ref 26–34)
MCH RBC QN AUTO: 30 PG (ref 26–34)
MCHC RBC AUTO-ENTMCNC: 31.7 G/DL (ref 32–36)
MCHC RBC AUTO-ENTMCNC: 33.1 G/DL (ref 32–36)
MCV RBC AUTO: 90 FL (ref 80–100)
MCV RBC AUTO: 95 FL (ref 80–100)
METHADONE UR QL SCN: NORMAL
MONOCYTES # BLD AUTO: 0.79 X10*3/UL (ref 0.05–0.8)
MONOCYTES NFR BLD AUTO: 11.6 %
NEUTROPHILS # BLD AUTO: 3.72 X10*3/UL (ref 1.6–5.5)
NEUTROPHILS NFR BLD AUTO: 54.9 %
NITRITE UR QL STRIP.AUTO: NEGATIVE
NRBC BLD-RTO: 0 /100 WBCS (ref 0–0)
NRBC BLD-RTO: 0 /100 WBCS (ref 0–0)
OPIATES UR QL SCN: NORMAL
OXYCODONE+OXYMORPHONE UR QL SCN: NORMAL
OXYHGB MFR BLDV: 57.8 % (ref 45–75)
PCO2 BLDV: 47 MM HG (ref 41–51)
PCP UR QL SCN: NORMAL
PH BLDV: 7.44 PH (ref 7.33–7.43)
PH UR STRIP.AUTO: 5.5 [PH]
PHOSPHATE SERPL-MCNC: 3.9 MG/DL (ref 2.5–4.9)
PLATELET # BLD AUTO: 222 X10*3/UL (ref 150–450)
PLATELET # BLD AUTO: 227 X10*3/UL (ref 150–450)
PO2 BLDV: 35 MM HG (ref 35–45)
POTASSIUM SERPL-SCNC: 3.6 MMOL/L (ref 3.5–5.3)
POTASSIUM SERPL-SCNC: 3.8 MMOL/L (ref 3.5–5.3)
PROT SERPL-MCNC: 5.8 G/DL (ref 6.4–8.2)
PROT SERPL-MCNC: 6.4 G/DL (ref 6.4–8.2)
PROT UR STRIP.AUTO-MCNC: NEGATIVE MG/DL
RBC # BLD AUTO: 2.97 X10*6/UL (ref 4–5.2)
RBC # BLD AUTO: 3.23 X10*6/UL (ref 4–5.2)
RBC # UR STRIP.AUTO: NEGATIVE /UL
RETICS #: 0.05 X10*6/UL (ref 0.02–0.11)
RETICS/RBC NFR AUTO: 1.7 % (ref 0.5–2)
SALICYLATES SERPL-MCNC: <3 MG/DL
SAO2 % BLDV: 59 % (ref 45–75)
SODIUM SERPL-SCNC: 137 MMOL/L (ref 136–145)
SODIUM SERPL-SCNC: 139 MMOL/L (ref 136–145)
SP GR UR STRIP.AUTO: 1.01
TIBC SERPL-MCNC: 215 UG/DL (ref 240–445)
TSH SERPL-ACNC: 1.78 MIU/L (ref 0.44–3.98)
UIBC SERPL-MCNC: 184 UG/DL (ref 110–370)
UROBILINOGEN UR STRIP.AUTO-MCNC: NORMAL MG/DL
VIT B12 SERPL-MCNC: 373 PG/ML (ref 211–911)
WBC # BLD AUTO: 5.9 X10*3/UL (ref 4.4–11.3)
WBC # BLD AUTO: 6.8 X10*3/UL (ref 4.4–11.3)

## 2025-01-11 PROCEDURE — 96365 THER/PROPH/DIAG IV INF INIT: CPT

## 2025-01-11 PROCEDURE — 84484 ASSAY OF TROPONIN QUANT: CPT | Performed by: STUDENT IN AN ORGANIZED HEALTH CARE EDUCATION/TRAINING PROGRAM

## 2025-01-11 PROCEDURE — 1100000001 HC PRIVATE ROOM DAILY

## 2025-01-11 PROCEDURE — 82805 BLOOD GASES W/O2 SATURATION: CPT

## 2025-01-11 PROCEDURE — 83880 ASSAY OF NATRIURETIC PEPTIDE: CPT | Performed by: STUDENT IN AN ORGANIZED HEALTH CARE EDUCATION/TRAINING PROGRAM

## 2025-01-11 PROCEDURE — 74177 CT ABD & PELVIS W/CONTRAST: CPT | Performed by: STUDENT IN AN ORGANIZED HEALTH CARE EDUCATION/TRAINING PROGRAM

## 2025-01-11 PROCEDURE — 85014 HEMATOCRIT: CPT | Performed by: INTERNAL MEDICINE

## 2025-01-11 PROCEDURE — 93010 ELECTROCARDIOGRAM REPORT: CPT | Performed by: INTERNAL MEDICINE

## 2025-01-11 PROCEDURE — 80307 DRUG TEST PRSMV CHEM ANLYZR: CPT | Performed by: STUDENT IN AN ORGANIZED HEALTH CARE EDUCATION/TRAINING PROGRAM

## 2025-01-11 PROCEDURE — 82550 ASSAY OF CK (CPK): CPT | Performed by: STUDENT IN AN ORGANIZED HEALTH CARE EDUCATION/TRAINING PROGRAM

## 2025-01-11 PROCEDURE — 74183 MRI ABD W/O CNTR FLWD CNTR: CPT | Performed by: RADIOLOGY

## 2025-01-11 PROCEDURE — 99285 EMERGENCY DEPT VISIT HI MDM: CPT | Mod: 25 | Performed by: STUDENT IN AN ORGANIZED HEALTH CARE EDUCATION/TRAINING PROGRAM

## 2025-01-11 PROCEDURE — 76705 ECHO EXAM OF ABDOMEN: CPT

## 2025-01-11 PROCEDURE — 2500000004 HC RX 250 GENERAL PHARMACY W/ HCPCS (ALT 636 FOR OP/ED)

## 2025-01-11 PROCEDURE — 82607 VITAMIN B-12: CPT | Mod: GEALAB

## 2025-01-11 PROCEDURE — 80053 COMPREHEN METABOLIC PANEL: CPT

## 2025-01-11 PROCEDURE — 84443 ASSAY THYROID STIM HORMONE: CPT | Performed by: STUDENT IN AN ORGANIZED HEALTH CARE EDUCATION/TRAINING PROGRAM

## 2025-01-11 PROCEDURE — 96366 THER/PROPH/DIAG IV INF ADDON: CPT

## 2025-01-11 PROCEDURE — 70450 CT HEAD/BRAIN W/O DYE: CPT | Performed by: RADIOLOGY

## 2025-01-11 PROCEDURE — 82746 ASSAY OF FOLIC ACID SERUM: CPT | Mod: GEALAB

## 2025-01-11 PROCEDURE — 71045 X-RAY EXAM CHEST 1 VIEW: CPT

## 2025-01-11 PROCEDURE — 2500000002 HC RX 250 W HCPCS SELF ADMINISTERED DRUGS (ALT 637 FOR MEDICARE OP, ALT 636 FOR OP/ED)

## 2025-01-11 PROCEDURE — 82140 ASSAY OF AMMONIA: CPT | Performed by: STUDENT IN AN ORGANIZED HEALTH CARE EDUCATION/TRAINING PROGRAM

## 2025-01-11 PROCEDURE — 85025 COMPLETE CBC W/AUTO DIFF WBC: CPT | Performed by: STUDENT IN AN ORGANIZED HEALTH CARE EDUCATION/TRAINING PROGRAM

## 2025-01-11 PROCEDURE — 36415 COLL VENOUS BLD VENIPUNCTURE: CPT | Performed by: STUDENT IN AN ORGANIZED HEALTH CARE EDUCATION/TRAINING PROGRAM

## 2025-01-11 PROCEDURE — 80320 DRUG SCREEN QUANTALCOHOLS: CPT | Performed by: STUDENT IN AN ORGANIZED HEALTH CARE EDUCATION/TRAINING PROGRAM

## 2025-01-11 PROCEDURE — A9575 INJ GADOTERATE MEGLUMI 0.1ML: HCPCS | Performed by: INTERNAL MEDICINE

## 2025-01-11 PROCEDURE — 86140 C-REACTIVE PROTEIN: CPT

## 2025-01-11 PROCEDURE — 74183 MRI ABD W/O CNTR FLWD CNTR: CPT

## 2025-01-11 PROCEDURE — 82746 ASSAY OF FOLIC ACID SERUM: CPT | Mod: GEALAB | Performed by: INTERNAL MEDICINE

## 2025-01-11 PROCEDURE — 84100 ASSAY OF PHOSPHORUS: CPT

## 2025-01-11 PROCEDURE — 82947 ASSAY GLUCOSE BLOOD QUANT: CPT

## 2025-01-11 PROCEDURE — 96375 TX/PRO/DX INJ NEW DRUG ADDON: CPT

## 2025-01-11 PROCEDURE — 2500000001 HC RX 250 WO HCPCS SELF ADMINISTERED DRUGS (ALT 637 FOR MEDICARE OP)

## 2025-01-11 PROCEDURE — 2500000004 HC RX 250 GENERAL PHARMACY W/ HCPCS (ALT 636 FOR OP/ED): Performed by: STUDENT IN AN ORGANIZED HEALTH CARE EDUCATION/TRAINING PROGRAM

## 2025-01-11 PROCEDURE — 82810 BLOOD GASES O2 SAT ONLY: CPT

## 2025-01-11 PROCEDURE — 2550000001 HC RX 255 CONTRASTS: Performed by: STUDENT IN AN ORGANIZED HEALTH CARE EDUCATION/TRAINING PROGRAM

## 2025-01-11 PROCEDURE — 71045 X-RAY EXAM CHEST 1 VIEW: CPT | Performed by: RADIOLOGY

## 2025-01-11 PROCEDURE — 80053 COMPREHEN METABOLIC PANEL: CPT | Performed by: STUDENT IN AN ORGANIZED HEALTH CARE EDUCATION/TRAINING PROGRAM

## 2025-01-11 PROCEDURE — 81003 URINALYSIS AUTO W/O SCOPE: CPT | Performed by: STUDENT IN AN ORGANIZED HEALTH CARE EDUCATION/TRAINING PROGRAM

## 2025-01-11 PROCEDURE — 85652 RBC SED RATE AUTOMATED: CPT

## 2025-01-11 PROCEDURE — 76705 ECHO EXAM OF ABDOMEN: CPT | Performed by: RADIOLOGY

## 2025-01-11 PROCEDURE — 83540 ASSAY OF IRON: CPT

## 2025-01-11 PROCEDURE — 94760 N-INVAS EAR/PLS OXIMETRY 1: CPT

## 2025-01-11 PROCEDURE — 82150 ASSAY OF AMYLASE: CPT

## 2025-01-11 PROCEDURE — 87040 BLOOD CULTURE FOR BACTERIA: CPT | Mod: GEALAB | Performed by: STUDENT IN AN ORGANIZED HEALTH CARE EDUCATION/TRAINING PROGRAM

## 2025-01-11 PROCEDURE — 83735 ASSAY OF MAGNESIUM: CPT

## 2025-01-11 PROCEDURE — 82728 ASSAY OF FERRITIN: CPT

## 2025-01-11 PROCEDURE — 85018 HEMOGLOBIN: CPT | Performed by: INTERNAL MEDICINE

## 2025-01-11 PROCEDURE — 2500000005 HC RX 250 GENERAL PHARMACY W/O HCPCS: Performed by: STUDENT IN AN ORGANIZED HEALTH CARE EDUCATION/TRAINING PROGRAM

## 2025-01-11 PROCEDURE — 74177 CT ABD & PELVIS W/CONTRAST: CPT

## 2025-01-11 PROCEDURE — 70450 CT HEAD/BRAIN W/O DYE: CPT

## 2025-01-11 PROCEDURE — 93005 ELECTROCARDIOGRAM TRACING: CPT

## 2025-01-11 PROCEDURE — 99223 1ST HOSP IP/OBS HIGH 75: CPT

## 2025-01-11 PROCEDURE — 80143 DRUG ASSAY ACETAMINOPHEN: CPT | Performed by: STUDENT IN AN ORGANIZED HEALTH CARE EDUCATION/TRAINING PROGRAM

## 2025-01-11 PROCEDURE — 83690 ASSAY OF LIPASE: CPT

## 2025-01-11 PROCEDURE — 83605 ASSAY OF LACTIC ACID: CPT | Performed by: STUDENT IN AN ORGANIZED HEALTH CARE EDUCATION/TRAINING PROGRAM

## 2025-01-11 PROCEDURE — 85045 AUTOMATED RETICULOCYTE COUNT: CPT

## 2025-01-11 PROCEDURE — 2550000001 HC RX 255 CONTRASTS: Performed by: INTERNAL MEDICINE

## 2025-01-11 PROCEDURE — 76376 3D RENDER W/INTRP POSTPROCES: CPT | Performed by: RADIOLOGY

## 2025-01-11 PROCEDURE — 85027 COMPLETE CBC AUTOMATED: CPT

## 2025-01-11 RX ORDER — FAMOTIDINE 20 MG/1
20 TABLET, FILM COATED ORAL DAILY
Status: DISPENSED | OUTPATIENT
Start: 2025-01-11

## 2025-01-11 RX ORDER — METOPROLOL SUCCINATE 50 MG/1
50 TABLET, EXTENDED RELEASE ORAL DAILY
Status: ACTIVE | OUTPATIENT
Start: 2025-01-11

## 2025-01-11 RX ORDER — SODIUM CHLORIDE 9 MG/ML
100 INJECTION, SOLUTION INTRAVENOUS CONTINUOUS
Status: ACTIVE | OUTPATIENT
Start: 2025-01-11 | End: 2025-01-12

## 2025-01-11 RX ORDER — GADOTERATE MEGLUMINE 376.9 MG/ML
0.2 INJECTION INTRAVENOUS
Status: COMPLETED | OUTPATIENT
Start: 2025-01-11 | End: 2025-01-11

## 2025-01-11 RX ORDER — ACETAMINOPHEN 160 MG/5ML
650 SOLUTION ORAL EVERY 4 HOURS PRN
Status: ACTIVE | OUTPATIENT
Start: 2025-01-11

## 2025-01-11 RX ORDER — DEXTROSE 50 % IN WATER (D50W) INTRAVENOUS SYRINGE
12.5
Status: ACTIVE | OUTPATIENT
Start: 2025-01-11

## 2025-01-11 RX ORDER — DEXTROSE 50 % IN WATER (D50W) INTRAVENOUS SYRINGE
25
Status: ACTIVE | OUTPATIENT
Start: 2025-01-11

## 2025-01-11 RX ORDER — PSYLLIUM HUSK 0.4 G
1 CAPSULE ORAL DAILY
Status: DISPENSED | OUTPATIENT
Start: 2025-01-12

## 2025-01-11 RX ORDER — INSULIN LISPRO 100 [IU]/ML
0-10 INJECTION, SOLUTION INTRAVENOUS; SUBCUTANEOUS EVERY 4 HOURS
Status: DISPENSED | OUTPATIENT
Start: 2025-01-11

## 2025-01-11 RX ORDER — PNV NO.95/FERROUS FUM/FOLIC AC 28MG-0.8MG
1 TABLET ORAL DAILY
Status: ON HOLD | COMMUNITY

## 2025-01-11 RX ORDER — ACETAMINOPHEN 325 MG/1
650 TABLET ORAL EVERY 4 HOURS PRN
Status: ACTIVE | OUTPATIENT
Start: 2025-01-11

## 2025-01-11 RX ORDER — INSULIN LISPRO 100 [IU]/ML
0-10 INJECTION, SOLUTION INTRAVENOUS; SUBCUTANEOUS EVERY 6 HOURS
Status: DISCONTINUED | OUTPATIENT
Start: 2025-01-11 | End: 2025-01-11

## 2025-01-11 RX ORDER — PRAVASTATIN SODIUM 40 MG/1
20 TABLET ORAL NIGHTLY
Status: DISPENSED | OUTPATIENT
Start: 2025-01-11

## 2025-01-11 RX ORDER — AMOXICILLIN 250 MG
2 CAPSULE ORAL NIGHTLY
Status: DISPENSED | OUTPATIENT
Start: 2025-01-11

## 2025-01-11 RX ORDER — MORPHINE SULFATE 2 MG/ML
2 INJECTION, SOLUTION INTRAMUSCULAR; INTRAVENOUS ONCE
Status: COMPLETED | OUTPATIENT
Start: 2025-01-11 | End: 2025-01-11

## 2025-01-11 RX ORDER — GABAPENTIN 100 MG/1
200 CAPSULE ORAL 2 TIMES DAILY
Status: DISPENSED | OUTPATIENT
Start: 2025-01-11

## 2025-01-11 RX ADMIN — SODIUM CHLORIDE, POTASSIUM CHLORIDE, SODIUM LACTATE AND CALCIUM CHLORIDE 500 ML: 600; 310; 30; 20 INJECTION, SOLUTION INTRAVENOUS at 05:11

## 2025-01-11 RX ADMIN — VANCOMYCIN HYDROCHLORIDE 2 G: 10 INJECTION, POWDER, LYOPHILIZED, FOR SOLUTION INTRAVENOUS at 02:09

## 2025-01-11 RX ADMIN — GABAPENTIN 200 MG: 100 CAPSULE ORAL at 20:32

## 2025-01-11 RX ADMIN — PIPERACILLIN SODIUM AND TAZOBACTAM SODIUM 3.38 G: 3; .375 INJECTION, SOLUTION INTRAVENOUS at 20:31

## 2025-01-11 RX ADMIN — MORPHINE SULFATE 2 MG: 2 INJECTION, SOLUTION INTRAMUSCULAR; INTRAVENOUS at 02:09

## 2025-01-11 RX ADMIN — SODIUM CHLORIDE 500 ML: 9 INJECTION, SOLUTION INTRAVENOUS at 02:09

## 2025-01-11 RX ADMIN — SODIUM CHLORIDE 75 ML/HR: 9 INJECTION, SOLUTION INTRAVENOUS at 06:13

## 2025-01-11 RX ADMIN — IOHEXOL 75 ML: 350 INJECTION, SOLUTION INTRAVENOUS at 02:38

## 2025-01-11 RX ADMIN — GADOTERATE MEGLUMINE 17 ML: 376.9 INJECTION INTRAVENOUS at 13:11

## 2025-01-11 RX ADMIN — PIPERACILLIN SODIUM AND TAZOBACTAM SODIUM 3.38 G: 3; .375 INJECTION, SOLUTION INTRAVENOUS at 06:15

## 2025-01-11 RX ADMIN — PIPERACILLIN SODIUM AND TAZOBACTAM SODIUM 3.38 G: 3; .375 INJECTION, SOLUTION INTRAVENOUS at 15:51

## 2025-01-11 RX ADMIN — PRAVASTATIN SODIUM 20 MG: 40 TABLET ORAL at 20:32

## 2025-01-11 RX ADMIN — HYDROMORPHONE HYDROCHLORIDE 0.4 MG: 1 INJECTION, SOLUTION INTRAMUSCULAR; INTRAVENOUS; SUBCUTANEOUS at 23:20

## 2025-01-11 RX ADMIN — SENNOSIDES AND DOCUSATE SODIUM 2 TABLET: 50; 8.6 TABLET ORAL at 20:32

## 2025-01-11 RX ADMIN — PIPERACILLIN SODIUM AND TAZOBACTAM SODIUM 4.5 G: 4; .5 INJECTION, SOLUTION INTRAVENOUS at 01:11

## 2025-01-11 RX ADMIN — INSULIN LISPRO 2 UNITS: 100 INJECTION, SOLUTION INTRAVENOUS; SUBCUTANEOUS at 06:24

## 2025-01-11 SDOH — SOCIAL STABILITY: SOCIAL INSECURITY: HAVE YOU HAD THOUGHTS OF HARMING ANYONE ELSE?: NO

## 2025-01-11 SDOH — ECONOMIC STABILITY: FOOD INSECURITY: HOW HARD IS IT FOR YOU TO PAY FOR THE VERY BASICS LIKE FOOD, HOUSING, MEDICAL CARE, AND HEATING?: PATIENT DECLINED

## 2025-01-11 SDOH — ECONOMIC STABILITY: TRANSPORTATION INSECURITY
IN THE PAST 12 MONTHS, HAS LACK OF TRANSPORTATION KEPT YOU FROM MEDICAL APPOINTMENTS OR FROM GETTING MEDICATIONS?: PATIENT DECLINED

## 2025-01-11 SDOH — SOCIAL STABILITY: SOCIAL INSECURITY: WITHIN THE LAST YEAR, HAVE YOU BEEN AFRAID OF YOUR PARTNER OR EX-PARTNER?: PATIENT DECLINED

## 2025-01-11 SDOH — SOCIAL STABILITY: SOCIAL INSECURITY: ABUSE: ADULT

## 2025-01-11 SDOH — SOCIAL STABILITY: SOCIAL INSECURITY: WERE YOU ABLE TO COMPLETE ALL THE BEHAVIORAL HEALTH SCREENINGS?: YES

## 2025-01-11 SDOH — HEALTH STABILITY: PHYSICAL HEALTH
HOW OFTEN DO YOU NEED TO HAVE SOMEONE HELP YOU WHEN YOU READ INSTRUCTIONS, PAMPHLETS, OR OTHER WRITTEN MATERIAL FROM YOUR DOCTOR OR PHARMACY?: NEVER

## 2025-01-11 SDOH — SOCIAL STABILITY: SOCIAL INSECURITY: DO YOU FEEL UNSAFE GOING BACK TO THE PLACE WHERE YOU ARE LIVING?: NO

## 2025-01-11 SDOH — SOCIAL STABILITY: SOCIAL INSECURITY: DO YOU FEEL ANYONE HAS EXPLOITED OR TAKEN ADVANTAGE OF YOU FINANCIALLY OR OF YOUR PERSONAL PROPERTY?: NO

## 2025-01-11 SDOH — SOCIAL STABILITY: SOCIAL INSECURITY: ARE THERE ANY APPARENT SIGNS OF INJURIES/BEHAVIORS THAT COULD BE RELATED TO ABUSE/NEGLECT?: NO

## 2025-01-11 SDOH — SOCIAL STABILITY: SOCIAL INSECURITY: HAVE YOU HAD ANY THOUGHTS OF HARMING ANYONE ELSE?: NO

## 2025-01-11 SDOH — ECONOMIC STABILITY: HOUSING INSECURITY: AT ANY TIME IN THE PAST 12 MONTHS, WERE YOU HOMELESS OR LIVING IN A SHELTER (INCLUDING NOW)?: PATIENT DECLINED

## 2025-01-11 SDOH — SOCIAL STABILITY: SOCIAL INSECURITY: ARE YOU OR HAVE YOU BEEN THREATENED OR ABUSED PHYSICALLY, EMOTIONALLY, OR SEXUALLY BY ANYONE?: NO

## 2025-01-11 SDOH — SOCIAL STABILITY: SOCIAL INSECURITY
WITHIN THE LAST YEAR, HAVE YOU BEEN HUMILIATED OR EMOTIONALLY ABUSED IN OTHER WAYS BY YOUR PARTNER OR EX-PARTNER?: PATIENT DECLINED

## 2025-01-11 SDOH — ECONOMIC STABILITY: HOUSING INSECURITY: IN THE PAST 12 MONTHS, HOW MANY TIMES HAVE YOU MOVED WHERE YOU WERE LIVING?: 0

## 2025-01-11 SDOH — ECONOMIC STABILITY: INCOME INSECURITY
IN THE PAST 12 MONTHS HAS THE ELECTRIC, GAS, OIL, OR WATER COMPANY THREATENED TO SHUT OFF SERVICES IN YOUR HOME?: PATIENT DECLINED

## 2025-01-11 SDOH — SOCIAL STABILITY: SOCIAL INSECURITY: HAS ANYONE EVER THREATENED TO HURT YOUR FAMILY OR YOUR PETS?: NO

## 2025-01-11 SDOH — ECONOMIC STABILITY: FOOD INSECURITY: WITHIN THE PAST 12 MONTHS, THE FOOD YOU BOUGHT JUST DIDN'T LAST AND YOU DIDN'T HAVE MONEY TO GET MORE.: PATIENT DECLINED

## 2025-01-11 SDOH — SOCIAL STABILITY: SOCIAL INSECURITY
WITHIN THE LAST YEAR, HAVE YOU BEEN KICKED, HIT, SLAPPED, OR OTHERWISE PHYSICALLY HURT BY YOUR PARTNER OR EX-PARTNER?: PATIENT DECLINED

## 2025-01-11 SDOH — SOCIAL STABILITY: SOCIAL INSECURITY
WITHIN THE LAST YEAR, HAVE YOU BEEN RAPED OR FORCED TO HAVE ANY KIND OF SEXUAL ACTIVITY BY YOUR PARTNER OR EX-PARTNER?: PATIENT DECLINED

## 2025-01-11 SDOH — SOCIAL STABILITY: SOCIAL INSECURITY: DOES ANYONE TRY TO KEEP YOU FROM HAVING/CONTACTING OTHER FRIENDS OR DOING THINGS OUTSIDE YOUR HOME?: NO

## 2025-01-11 SDOH — ECONOMIC STABILITY: FOOD INSECURITY
WITHIN THE PAST 12 MONTHS, YOU WORRIED THAT YOUR FOOD WOULD RUN OUT BEFORE YOU GOT THE MONEY TO BUY MORE.: PATIENT DECLINED

## 2025-01-11 SDOH — ECONOMIC STABILITY: HOUSING INSECURITY: IN THE LAST 12 MONTHS, WAS THERE A TIME WHEN YOU WERE NOT ABLE TO PAY THE MORTGAGE OR RENT ON TIME?: PATIENT DECLINED

## 2025-01-11 ASSESSMENT — PATIENT HEALTH QUESTIONNAIRE - PHQ9
1. LITTLE INTEREST OR PLEASURE IN DOING THINGS: NOT AT ALL
SUM OF ALL RESPONSES TO PHQ9 QUESTIONS 1 & 2: 0
2. FEELING DOWN, DEPRESSED OR HOPELESS: NOT AT ALL

## 2025-01-11 ASSESSMENT — COLUMBIA-SUICIDE SEVERITY RATING SCALE - C-SSRS
1. IN THE PAST MONTH, HAVE YOU WISHED YOU WERE DEAD OR WISHED YOU COULD GO TO SLEEP AND NOT WAKE UP?: NO
6. HAVE YOU EVER DONE ANYTHING, STARTED TO DO ANYTHING, OR PREPARED TO DO ANYTHING TO END YOUR LIFE?: NO
2. HAVE YOU ACTUALLY HAD ANY THOUGHTS OF KILLING YOURSELF?: NO

## 2025-01-11 ASSESSMENT — COGNITIVE AND FUNCTIONAL STATUS - GENERAL
WALKING IN HOSPITAL ROOM: A LOT
DRESSING REGULAR UPPER BODY CLOTHING: A LOT
MOVING FROM LYING ON BACK TO SITTING ON SIDE OF FLAT BED WITH BEDRAILS: A LITTLE
DRESSING REGULAR UPPER BODY CLOTHING: A LOT
EATING MEALS: A LITTLE
EATING MEALS: A LITTLE
MOVING TO AND FROM BED TO CHAIR: A LOT
CLIMB 3 TO 5 STEPS WITH RAILING: A LOT
PERSONAL GROOMING: A LITTLE
MOBILITY SCORE: 14
HELP NEEDED FOR BATHING: A LOT
TOILETING: A LOT
STANDING UP FROM CHAIR USING ARMS: A LOT
MOBILITY SCORE: 14
PERSONAL GROOMING: A LITTLE
MOVING TO AND FROM BED TO CHAIR: A LOT
DAILY ACTIVITIY SCORE: 14
DRESSING REGULAR LOWER BODY CLOTHING: A LOT
MOVING TO AND FROM BED TO CHAIR: A LOT
DRESSING REGULAR LOWER BODY CLOTHING: A LOT
STANDING UP FROM CHAIR USING ARMS: A LOT
CLIMB 3 TO 5 STEPS WITH RAILING: A LOT
HELP NEEDED FOR BATHING: A LOT
TOILETING: A LOT
HELP NEEDED FOR BATHING: A LOT
WALKING IN HOSPITAL ROOM: A LOT
DAILY ACTIVITIY SCORE: 14
TURNING FROM BACK TO SIDE WHILE IN FLAT BAD: A LITTLE
WALKING IN HOSPITAL ROOM: A LOT
PERSONAL GROOMING: A LITTLE
DRESSING REGULAR LOWER BODY CLOTHING: A LOT
EATING MEALS: A LITTLE
DRESSING REGULAR UPPER BODY CLOTHING: A LOT
MOVING FROM LYING ON BACK TO SITTING ON SIDE OF FLAT BED WITH BEDRAILS: A LITTLE
STANDING UP FROM CHAIR USING ARMS: A LOT
TURNING FROM BACK TO SIDE WHILE IN FLAT BAD: A LITTLE
TOILETING: A LOT
MOBILITY SCORE: 14
TURNING FROM BACK TO SIDE WHILE IN FLAT BAD: A LITTLE
DAILY ACTIVITIY SCORE: 14
PATIENT BASELINE BEDBOUND: NO
CLIMB 3 TO 5 STEPS WITH RAILING: A LOT
MOVING FROM LYING ON BACK TO SITTING ON SIDE OF FLAT BED WITH BEDRAILS: A LITTLE

## 2025-01-11 ASSESSMENT — ENCOUNTER SYMPTOMS
OCCASIONAL FEELINGS OF UNSTEADINESS: 0
DEPRESSION: 0
LOSS OF SENSATION IN FEET: 0

## 2025-01-11 ASSESSMENT — ACTIVITIES OF DAILY LIVING (ADL)
LACK_OF_TRANSPORTATION: PATIENT DECLINED
LACK_OF_TRANSPORTATION: PATIENT DECLINED
JUDGMENT_ADEQUATE_SAFELY_COMPLETE_DAILY_ACTIVITIES: YES
GROOMING: NEEDS ASSISTANCE
TOILETING: NEEDS ASSISTANCE
WALKS IN HOME: NEEDS ASSISTANCE
BATHING: NEEDS ASSISTANCE
ADEQUATE_TO_COMPLETE_ADL: YES
DRESSING YOURSELF: NEEDS ASSISTANCE
FEEDING YOURSELF: NEEDS ASSISTANCE
HEARING - RIGHT EAR: FUNCTIONAL
HEARING - LEFT EAR: FUNCTIONAL
PATIENT'S MEMORY ADEQUATE TO SAFELY COMPLETE DAILY ACTIVITIES?: YES

## 2025-01-11 ASSESSMENT — LIFESTYLE VARIABLES
HOW OFTEN DO YOU HAVE 6 OR MORE DRINKS ON ONE OCCASION: NEVER
AUDIT-C TOTAL SCORE: 0
EVER HAD A DRINK FIRST THING IN THE MORNING TO STEADY YOUR NERVES TO GET RID OF A HANGOVER: NO
HAVE PEOPLE ANNOYED YOU BY CRITICIZING YOUR DRINKING: NO
TOTAL SCORE: 0
EVER FELT BAD OR GUILTY ABOUT YOUR DRINKING: NO
AUDIT-C TOTAL SCORE: 0
HOW OFTEN DO YOU HAVE A DRINK CONTAINING ALCOHOL: NEVER
HAVE YOU EVER FELT YOU SHOULD CUT DOWN ON YOUR DRINKING: NO
HOW MANY STANDARD DRINKS CONTAINING ALCOHOL DO YOU HAVE ON A TYPICAL DAY: PATIENT DOES NOT DRINK
SKIP TO QUESTIONS 9-10: 1

## 2025-01-11 ASSESSMENT — PAIN SCALES - GENERAL
PAINLEVEL_OUTOF10: 8
PAINLEVEL_OUTOF10: 0 - NO PAIN
PAINLEVEL_OUTOF10: 0 - NO PAIN
PAINLEVEL_OUTOF10: 8
PAINLEVEL_OUTOF10: 0 - NO PAIN

## 2025-01-11 ASSESSMENT — PAIN - FUNCTIONAL ASSESSMENT
PAIN_FUNCTIONAL_ASSESSMENT: UNABLE TO SELF-REPORT
PAIN_FUNCTIONAL_ASSESSMENT: 0-10

## 2025-01-11 ASSESSMENT — PAIN DESCRIPTION - LOCATION: LOCATION: BACK

## 2025-01-11 NOTE — ED PROVIDER NOTES
History/Exam limitations: Presentation  HPI was provided by EMS, chart review   HPI:    Chief Complaint   Patient presents with    Altered Mental Status     Pt brought in by squad from nursing home with altered mental status. Pt not responding to verbal commands.        Donna J Gonda is a 84 y.o. female presents with chief complaint of  altered mental status. Patient normally is alert and oriented x 3.  Comes from [place].  Reported that around dinnertime/6 PM was normal and progressively has gotten to where she is not talking to anybody.  Does make noises here and follows commands.  Was brought by EMS.  No reported new medications.  He does not made better or worse by anything.  No treatments tried prior to arrival     She has a past medical history significant for ambulatory dysfunction, CKD stage III, dementia, and she was on her office visit from the ninth she has been having increased confusion for 2 days prior and decreased ability to ambulate around and need increased assistance for transfers.  She has been slumping to the left in her chair which is a new development for her also ongoing since the ninth.  She has had increased swelling of her legs over the past few days.  Office visit on the ninth she was only alert to person but not place and time             ROS: Limited secondary to presentation     Physical Exam:  GENERAL: Alert, looking around room but not speaking,  in no acute distress.  HEAD: normocephalic, atraumatic  SKIN:  dry skin, erythema on bilateral calves with wound on right lower extremity no purulence or pain out of proportion to exam  EYES: PERRL, EOMs intact,  Conjunctiva pink with no erythema or exudates. No scleral icterus.   ENT: No external deformities. Nares patent, mucus membranes moist.  Pharynx clear, uvula midline.   NECK: Supple, without meningismus. Trachea at midline. No lymphadenopathy.  PULMONARY: Clear bilaterally. No crackles, rhonchi, wheezing.  No respiratory  distress.  No work of breathing.  CARDIAC: Regular rate and regular rhythm.  Pulses 2+ in radials and dorsal pedal pulses bilaterally.  No murmur, rub, gallop.  Edema bilateral lower extremities  ABDOMEN: Soft, nontender, active bowel sounds.  No palpable organomegaly.  No rebound or guarding.  No CVA tenderness.  No pulsatile masses.  MUSCULOSKELETAL: No obvious deformity.  No swelling in bilateral upper or lower extremities strength exam limited as patient follows directions at times but not at other times.  NEUROLOGICAL: GCS 13.  Neuro vastly intact in bilateral upper and lower extremities           MDM/ED COURSE:    The patient presented for evaluation of altered mental status  Differential includes but not limited to electrolyte abnormality, infection, stroke, arrhythmia, medication side effect.  Imaging studies if performed were independently reviewed and interpreted by myself and confirmed by radiologist.      I discussed the differential, results and plan with the patient and/or family/friend/caregiver if present. All questions answered.      ED Course as of 01/11/25 0147   Sat Jan 11, 2025   0037 EKG interpreted by me shows sinus rhythm with first-degree AV block.  Bifascicular block.  No STEMI.  Rate 86.  NY interval 240.  Compared to prior EKG similar findings present [WL]   0046 Lower extremities consistent with cellulitis.  Was given a dose of Vanco and Zosyn.  Blood cultures also performed. [WL]   0124 On reevaluation patient now talking alert to person and place. [WL]   0124 Chest x-ray unremarkable for acute findings [WL]   0124 CT head showsNo acute intracranial abnormality. If symptoms persist or there is  high clinical suspicion further evaluation with MRI can be considered.      Chronic changes as noted above.   [WL]   0146 She is now stating she has generalized pain all over.  Will give a dose of morphine. [WL]   0146 Spoke with Bev suarez for hospitalist service who agrees on admission. [WL]       ED Course User Index  [WL] Juan Pablo ZEB Nagel DO         Diagnoses as of 01/11/25 0147   Altered mental status, unspecified altered mental status type   Cellulitis, unspecified cellulitis site   NATALIE (acute kidney injury) (CMS-HCC)         History reviewed. No pertinent past medical history.   Social History     Socioeconomic History    Marital status: Single   Tobacco Use    Smoking status: Never    Smokeless tobacco: Never   Vaping Use    Vaping status: Never Used   Substance and Sexual Activity    Alcohol use: Never    Drug use: Never    Sexual activity: Defer     Current Outpatient Medications   Medication Instructions    acetaminophen (Tylenol) 500 mg capsule oral, Every 8 hours PRN    Eliquis 5 mg, oral, 2 times daily    famotidine (Pepcid) 20 mg tablet oral, 2 times daily    gabapentin (Neurontin) 300 mg capsule 1 capsule, Every 12 hours    lovastatin (MEVACOR) 20 mg, oral, Daily    metFORMIN (Glucophage) 500 mg tablet     metoprolol succinate XL (Toprol-XL) 50 mg 24 hr tablet     Ozempic 0.25 mg or 0.5 mg (2 mg/3 mL) pen injector     valsartan-hydrochlorothiazide (Diovan-HCT) 160-12.5 mg tablet      No Known Allergies          ED Triage Vitals   Temp Pulse Resp BP   -- -- -- --      SpO2 Temp src Heart Rate Source Patient Position   -- -- -- --      BP Location FiO2 (%)     -- --               Labs and Imaging  XR chest 1 view   Final Result   No acute cardiopulmonary process.        MACRO:   None        Signed by: Jacob Reyes 1/11/2025 1:10 AM   Dictation workstation:   [id number]      CT head wo IV contrast   Final Result   No acute intracranial abnormality. If symptoms persist or there is   high clinical suspicion further evaluation with MRI can be considered.        Chronic changes as noted above.        MACRO:   None        Signed by: Jacob Reyes 1/11/2025 1:07 AM   Dictation workstation:   [id number]        Labs Reviewed   CBC WITH AUTO DIFFERENTIAL - Abnormal       Result Value    WBC 6.8       nRBC 0.0      RBC 3.23 (*)     Hemoglobin 9.7 (*)     Hematocrit 30.6 (*)     MCV 95      MCH 30.0      MCHC 31.7 (*)     RDW 13.2      Platelets 222      Neutrophils % 54.9      Immature Granulocytes %, Automated 0.1      Lymphocytes % 29.9      Monocytes % 11.6      Eosinophils % 2.9      Basophils % 0.6      Neutrophils Absolute 3.72      Immature Granulocytes Absolute, Automated 0.01      Lymphocytes Absolute 2.03      Monocytes Absolute 0.79      Eosinophils Absolute 0.20      Basophils Absolute 0.04     COMPREHENSIVE METABOLIC PANEL - Abnormal    Glucose 198 (*)     Sodium 139      Potassium 3.8      Chloride 98      Bicarbonate 31      Anion Gap 14      Urea Nitrogen 36 (*)     Creatinine 1.35 (*)     eGFR 38 (*)     Calcium 9.7      Albumin 3.3 (*)     Alkaline Phosphatase 52      Total Protein 6.4      AST 24      Bilirubin, Total 0.4      ALT 17     URINALYSIS WITH REFLEX MICROSCOPIC - Abnormal    Color, Urine Colorless (*)     Appearance, Urine Clear      Specific Gravity, Urine 1.011      pH, Urine 5.5      Protein, Urine NEGATIVE      Glucose, Urine Normal      Blood, Urine NEGATIVE      Ketones, Urine NEGATIVE      Bilirubin, Urine NEGATIVE      Urobilinogen, Urine Normal      Nitrite, Urine NEGATIVE      Leukocyte Esterase, Urine NEGATIVE     AMMONIA - Normal    Ammonia 16     TSH WITH REFLEX TO FREE T4 IF ABNORMAL - Normal    Thyroid Stimulating Hormone 1.78      Narrative:     TSH testing is performed using different testing methodology at [facility] than at other [facility] hospitals. Direct result comparisons should only be made within the same method.     B-TYPE NATRIURETIC PEPTIDE - Normal    BNP 51      Narrative:        <100 pg/mL - Heart failure unlikely  100-299 pg/mL - Intermediate probability of acute heart                  failure exacerbation. Correlate with clinical                  context and patient history.    >=300 pg/mL - Heart Failure likely. Correlate with  clinical                  context and patient history.    BNP testing is performed using different testing methodology at [facility] than at other [facility]. Direct result comparisons should only be made within the same method.      CREATINE KINASE - Normal    Creatine Kinase 192     ACUTE TOXICOLOGY PANEL, BLOOD - Normal    Acetaminophen <10.0      Salicylate  <3      Alcohol <10     DRUG SCREEN,URINE - Normal    Amphetamine Screen, Urine Presumptive Negative      Barbiturate Screen, Urine Presumptive Negative      Benzodiazepines Screen, Urine Presumptive Negative      Cannabinoid Screen, Urine Presumptive Negative      Cocaine Metabolite Screen, Urine Presumptive Negative      Fentanyl Screen, Urine Presumptive Negative      Opiate Screen, Urine Presumptive Negative      Oxycodone Screen, Urine Presumptive Negative      PCP Screen, Urine Presumptive Negative      Methadone Screen, Urine Presumptive Negative      Narrative:     Drug screen results are presumptive and should not be used to assess   compliance with prescribed medication. Contact the performing [facility] laboratory   to add-on definitive confirmatory testing if clinically indicated.    Toxicology screening results are reported qualitatively. The concentration must   be greater than or equal to the cutoff to be reported as positive. The concentration   at which the screening test can detect an individual drug or metabolite varies.   The absence of expected drug(s) and/or drug metabolite(s) may indicate non-compliance,   inappropriate timing of specimen collection relative to drug administration, poor drug   absorption, diluted/adulterated urine, or limitations of testing. For medical purposes   only; not valid for forensic use.    Interpretive questions should be directed to the laboratory medical directors.   SERIAL TROPONIN-INITIAL - Normal    Troponin I, High Sensitivity 12      Narrative:     Less than 99th percentile of normal  range cutoff-  Female and children under 18 years old <14 ng/L; Male <21 ng/L: Negative  Repeat testing should be performed if clinically indicated.     Female and children under 18 years old 14-50 ng/L; Male 21-50 ng/L:  Consistent with possible cardiac damage and possible increased clinical   risk. Serial measurements may help to assess extent of myocardial damage.     >50 ng/L: Consistent with cardiac damage, increased clinical risk and  myocardial infarction. Serial measurements may help assess extent of   myocardial damage.      NOTE: Children less than 1 year old may have higher baseline troponin   levels and results should be interpreted in conjunction with the overall   clinical context.     NOTE: Troponin I testing is performed using a different   testing methodology at [facility] than at other   [facility]. Direct result comparisons should only   be made within the same method.   LACTATE - Normal    Lactate 1.5      Narrative:     Venipuncture immediately after or during the administration of Metamizole may lead to falsely low results. Testing should be performed immediately prior to Metamizole dosing.   BLOOD CULTURE   BLOOD CULTURE   TROPONIN SERIES- (INITIAL, 1 HR)    Narrative:     The following orders were created for panel order Troponin I Series, High Sensitivity (0, 1 HR).  Procedure                               Abnormality         Status                     ---------                               -----------         ------                     Troponin I, High Sensiti...[[id number]]  Normal              Final result               Troponin, High Sensitivi...[[id number]]                                                   Please view results for these tests on the individual orders.   SERIAL TROPONIN, 1 HOUR   BLOOD GAS VENOUS           Procedure  Procedures                  Juan Pablo Nagel DO  01/11/25 0147

## 2025-01-11 NOTE — ED TRIAGE NOTES
Pt brought in by squad from nursing home with altered mental status since jan 7. Pt not responding to verbal commands.

## 2025-01-11 NOTE — PROGRESS NOTES
Patient: Donna J Gonda   Age: 84 y.o.   Gender: female   Room/Bed: 128/128-A     Attending: Rodrick Mayorga DO  Code Status:  DNR and No Intubation  Admitted Date: 1/11/2025 12:22 AM    Chief Complaint:  Patient: Donna J Gonda is a 84 y.o. female with PMHX DVT on Eliquis, hx R breast ca s/p lumpectomy, CKD3a, dementia baseline A&O x3, chronic low back pain, HTN, HLD, T2DM, left iliopsoas tendon rupture who presents for metabolic encephalopathy, admitted for suspected abdominal infection and iliacus hematoma.     OVERNIGHT: No significant events reported.    SUBJECTIVE:       Patient was sleeping but easily awakened. She was not aware why she ended up in the hospital. Denies abd pain and states she has L leg pain for x2 months. She has been using a walker because she feels unstable from weakness. Denies other acute concerns or complaints.           ALLERGIES PAST MEDICAL HISTORY PAST SURGICAL SURGERY FAMILY HISTORY SOCIAL HISTORY   No Known Allergies History reviewed. No pertinent past medical history. History reviewed. No pertinent surgical history. No family history on file. Social History     Tobacco Use    Smoking status: Never    Smokeless tobacco: Never   Vaping Use    Vaping status: Never Used   Substance Use Topics    Alcohol use: Never    Drug use: Never              MEDS:      HOME MEDS SCHEDULED MEDS PRN IV MEDS   Current Outpatient Medications   Medication Instructions    acetaminophen (Tylenol) 500 mg capsule oral, Every 8 hours PRN    Eliquis 5 mg, oral, 2 times daily    famotidine (Pepcid) 20 mg tablet oral, 2 times daily    gabapentin (Neurontin) 300 mg capsule 1 capsule, Every 12 hours    lovastatin (MEVACOR) 20 mg, oral, Daily    metFORMIN (Glucophage) 500 mg tablet     metoprolol succinate XL (Toprol-XL) 50 mg 24 hr tablet     Ozempic 0.25 mg or 0.5 mg (2 mg/3 mL) pen injector     valsartan-hydrochlorothiazide (Diovan-HCT) 160-12.5 mg tablet     [Held by provider] apixaban, 5 mg, oral,  BID  famotidine, 20 mg, oral, Daily  gabapentin, 200 mg, oral, BID  insulin lispro, 0-10 Units, subcutaneous, q4h  [Held by provider] metoprolol succinate XL, 50 mg, oral, Daily  piperacillin-tazobactam, 3.375 g, intravenous, q6h  pravastatin, 20 mg, oral, Nightly  sennosides-docusate sodium, 2 tablet, oral, Nightly  [Held by provider] valsartan 160 mg, hydroCHLOROthiazide 12.5 mg for Diovan HCT, , oral, Daily     PRN medications: acetaminophen **OR** acetaminophen, dextrose, dextrose, glucagon, glucagon, HYDROmorphone, HYDROmorphone sodium chloride 0.9%, 100 mL/hr, Last Rate: 100 mL/hr (01/11/25 0917)              OBJECTIVE:     Physical Exam  Constitutional:       Appearance: She is obese. She is not ill-appearing.   Cardiovascular:      Rate and Rhythm: Normal rate and regular rhythm.      Pulses: Normal pulses.      Heart sounds: Normal heart sounds.   Pulmonary:      Effort: Pulmonary effort is normal.      Breath sounds: Normal breath sounds.   Abdominal:      Palpations: Abdomen is soft.      Tenderness: There is abdominal tenderness. There is guarding. There is no rebound.      Comments: Mild TTP to RUQ with involuntary guarding   Musculoskeletal:         General: Tenderness present.      Right lower leg: No edema.      Left lower leg: No edema.      Comments: TTP to mediolateral aspect of L thigh   Skin:     General: Skin is warm and dry.      Findings: Rash present. No bruising.      Comments: Non pruritic, nontender rash about 4 cm in length.   Neurological:      General: No focal deficit present.      Mental Status: She is alert and oriented to person, place, and time.   Psychiatric:         Mood and Affect: Mood normal.         Behavior: Behavior normal.                  VITALS:  Vitals:    01/11/25 0300 01/11/25 0327 01/11/25 0330 01/11/25 0544   BP: 118/53  117/67 119/60   BP Location:   Right arm Right arm   Patient Position:   Lying Lying   Pulse: 89  93 87   Resp: 12  18 17   Temp: 36.8 °C (98.2  "°F)  36.4 °C (97.5 °F) 36.4 °C (97.5 °F)   TempSrc:   Temporal Temporal   SpO2: 98%  99% 98%   Weight:  88 kg (194 lb 0.1 oz)     Height:  1.575 m (5' 2.01\")             Intake/Output Summary (Last 24 hours) at 1/11/2025 0919  Last data filed at 1/11/2025 0650  Gross per 24 hour   Intake 1590 ml   Output --   Net 1590 ml         LABS:     CBC:  Recent Labs     01/11/25  0711 01/11/25  0028 01/02/25  0650   WBC 5.9 6.8 5.8   RBC 2.97* 3.23* 3.51*   HGB 8.8* 9.7* 10.6*   HCT 26.6* 30.6* 33.7*   MCV 90 95 96   MCH 29.6 30.0 30.2   MCHC 33.1 31.7* 31.5*   RDW 13.0 13.2 13.1    222 219     CMP:  Recent Labs     01/11/25  0711 01/11/25  0028 01/10/25  0615    139 141   K 3.6 3.8 4.3   CL 99 98 102   CO2 30 31 30   ANIONGAP 12 14 13   BUN 31* 36* 29*   CREATININE 1.24* 1.35* 1.08*   EGFR 42* 38* 50*   GLUCOSE 137* 198* 102*     Recent Labs     01/11/25  0711 01/11/25  0028 08/28/24  0615 04/12/23  1424 04/12/23  1229   ALBUMIN 3.0* 3.3* 3.6   < > 3.5   ALKPHOS 44 52 37   < > 57   ALT 14 17 11   < > 16   AST 20 24 17   < > 17   BILITOT 0.5 0.4 0.6   < > 0.6   LIPASE 87*  --   --   --  88*    < > = values in this interval not displayed.     Calcium/Phos:   Recent Labs     01/11/25  0711 01/11/25  0028 01/10/25  0615 04/18/23  0615 04/12/23  1424   CALCIUM 9.2 9.7 9.9   < > 11.1*   PHOS 3.9  --  3.8  --  2.9    < > = values in this interval not displayed.      COAG:   Recent Labs     02/03/23  0800 02/01/23  0754 01/24/23  0930   INR 1.3* 2.1* 3.4*     CRP:   Lab Results   Component Value Date    CRP 8.29 (H) 01/11/2025       ENDO:  Recent Labs     01/11/25  0028 08/28/24  0615 07/22/24  0730 04/15/24  0731 01/23/24  0921 05/26/22  0745 02/28/22  1000 06/25/21  0800 11/19/20  0830 07/16/19  0700   TSH 1.78  --   --   --   --   --  1.01  --  1.18 1.46   HGBA1C  --  6.9* 6.9* 6.8* 6.6*   < > 9.2*   < > 8.6 8.1    < > = values in this interval not displayed.      CARDIAC:   Recent Labs     01/11/25  0207 " 01/11/25  0028 01/10/25  0615 04/12/23  1424   TROPHS 9 12  --  13   BNP  --  51 134*  --      Recent Labs     01/02/25  0650 01/23/24  0921 10/12/23  0732 02/28/22  1000 11/19/20  0830 07/10/18  0730   CHOL 127 109 118 145 163 146   LDLF  --   --   --  63 63 63   LDLCALC 53 36 34  --   --   --    HDL 58.3 52.9 51.8 59.2 63.7 59.4   TRIG 78 99 163* 116 183* 118     No data recorded    MICRO/ID:   No results found for the last 90 days.                    Lab Results   Component Value Date    URINECULTURE >100,000 Escherichia coli (A) 09/12/2024         NUTRITION STATUS:           IMAGING:     CT abdomen pelvis w IV contrast    Result Date: 1/11/2025  Interpreted By:  Pravin David, STUDY: CT ABDOMEN PELVIS W IV CONTRAST;  1/11/2025 2:37 am   INDICATION: Signs/Symptoms:RUQ abd pain.     COMPARISON: CT abdomen and pelvis dated 12/17/2013; MRI of the lumbar spine dated 09/12/2023.   ACCESSION NUMBER(S): [id number]   ORDERING CLINICIAN: RENALDO LOERA   TECHNIQUE: Contiguous axial images of the abdomen and pelvis were obtained after the intravenous administration of 75 mL of Omnipaque 350 iodinated contrast. Coronal and sagittal reformatted images were reconstructed from the axial data.   FINDINGS: LOWER CHEST: Atelectasis/scarring are present in the lower lobes bilaterally, without evidence of consolidation or pleural effusion. 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   Heart is normal in size without pericardial effusion.   Distal esophagus is unremarkable in appearance.     ABDOMEN/PELVIS:   Images are somewhat degraded by motion.   ABDOMINAL WALL: There is a ventral abdominal wall hernia along the midline. No acute abnormalities identified in the cutaneous tissues of the   LIVER: No acute hepatic parenchymal abnormality is present.   BILE DUCTS: There is dilatation of the central intrahepatic biliary tree and common bile duct, somewhat increased from prior  study in 2013.   GALLBLADDER: Gallbladder is surgically absent.   PANCREAS: No peripancreatic stranding is present. The proximal pancreatic duct is dilated, measuring up to 6 mm in diameter, previously measuring 4 mm in December of 2013.   SPLEEN: No acute splenic abnormality is present.   ADRENALS: Bilateral adrenal glands are unremarkable in appearance.   KIDNEYS, URETERS, BLADDER: Kidneys are symmetric in size and enhancement without hydronephrosis or radiopaque nephrolithiasis. Several exophytic cysts are present bilaterally, largest measuring up to 4.7 cm in size.   Ureters are not dilated. No bladder wall thickening is present.   REPRODUCTIVE ORGANS: Uterus is surgically absent. No adnexal masses are identified.   VESSELS: Mild vascular calcifications are present in the abdominal aorta. No acute vascular abnormality is identified. IVC is flattened.   RETROPERITONEUM/LYMPH NODES: The left psoas and iliacus muscles (series 201, image 78 and 113) demonstrate asymmetric enlargement compared to the contralateral right side, with somewhat indistinct appearance of the left iliacus, new since prior MRI of the lumbar spine on 09/12/2023. No enlarged lymph nodes.   BOWEL/MESENTERY/PERITONEUM: Scattered diverticula are present in the descending and sigmoid colon without evidence of acute diverticulitis. No inflammatory bowel wall thickening or dilatation. Appendix is not definitely identified, no secondary signs of acute appendicitis present in the right lower quadrant.   No ascites, free air, or fluid collection.     MUSCULOSKELETAL: No acute osseous abnormality. No suspicious osseous lesion. Multilevel degenerative changes of the lower thoracic and lumbar spine without compression fracture or high-grade stenosis.       1. New central intrahepatic biliary dilatation with mild increased distention of the common bile duct compared to prior CT in 2013, now measuring up to 11 mm in diameter, without evidence of  choledocholithiasis. Additionally, there is slight increase in the dilatation of the pancreatic duct compared to 2013, now measuring up to 6 mm compared to 4 mm previously. Finding is of unclear etiology, although differential includes a mass at the sphincter of Oddi, radiolucent choledocholithiasis in the distal CBD, and ascending cholangitis. 2. Asymmetric enlargement of the left psoas and iliacus muscles compared to the contralateral right side, new since prior MRI of the lumbar spine on 09/12/2023, with somewhat indistinct appearance of the iliacus near the left hip. Finding is suspicious for underlying hematoma of indeterminate chronicity, possibly subacute/chronic, although other infiltrative processes are not entirely excluded. Correlate with any history of trauma. 3. Flattened IVC. Correlate with fluid volume status and hypovolemia. 4. Numerous diverticula are present in the descending and sigmoid colon without evidence of acute diverticulitis.     MACRO: None.   Signed by: Pravin David 1/11/2025 3:06 AM Dictation workstation:   [id number]    XR chest 1 view    Result Date: 1/11/2025  Interpreted By:  Jacob Reyes, STUDY: XR CHEST 1 VIEW;  1/11/2025 1:05 am   INDICATION: Signs/Symptoms:AMS.   COMPARISON: Chest x-ray 03/29/2011   ACCESSION NUMBER(S): [id number]   ORDERING CLINICIAN: RENALDO LOERA   FINDINGS: Right-sided MediPort terminates in the SVC. Overlying leads noted.   CARDIOMEDIASTINAL SILHOUETTE: Cardiomediastinal silhouette is stable in size and configuration.   LUNGS: No consolidation, pleural effusion or pneumothorax. Left basilar atelectasis and or scarring.   ABDOMEN: No remarkable upper abdominal findings.   BONES: Multilevel degenerative changes of the spine. Bilateral shoulder osteoarthrosis.       No acute cardiopulmonary process.   MACRO: None   Signed by: Jacob Reyes 1/11/2025 1:10 AM Dictation workstation:   [id number]    CT head wo IV contrast    Result Date:  1/11/2025  Interpreted By:  Jacob Reyes, STUDY: CT HEAD WO IV CONTRAST;  1/11/2025 1:00 am   INDICATION: Signs/Symptoms:ams.   COMPARISON: None.   ACCESSION NUMBER(S): [id number]   ORDERING CLINICIAN: RENALDO LOERA   TECHNIQUE: Axial noncontrast CT images of the head.   FINDINGS: BRAIN PARENCHYMA: Gray-white matter interfaces are preserved. No mass, mass effect or midline shift. Mild deep and periventricular white matter hypodensities are nonspecific, but favored to represent chronic small vessel ischemic changes.   HEMORRHAGE: No acute intracranial hemorrhage. VENTRICLES and EXTRA-AXIAL SPACES: Mild-to-moderate volume loss with prominence of the ventricles and sulci. EXTRACRANIAL SOFT TISSUES: Within normal limits. PARANASAL SINUSES/MASTOIDS: The visualized paranasal sinuses and mastoid air cells are aerated. CALVARIUM: No depressed skull fracture. No destructive osseous lesion.   OTHER FINDINGS: Atherosclerotic calcification of the carotid siphons and vertebral arteries.       No acute intracranial abnormality. If symptoms persist or there is high clinical suspicion further evaluation with MRI can be considered.   Chronic changes as noted above.   MACRO: None   Signed by: Jacob Reyes 1/11/2025 1:07 AM Dictation workstation:   [id number]          ASSESSMENT & PLAN  Donna J Gonda is a 84 y.o. female with PMHX  DVT on Eliquis, hx R breast ca s/p lumpectomy, CKD3a, dementia baseline A&O x3, chronic low back pain, HTN, HLD, T2DM, left iliopsoas tendon rupture who presents for metabolic encephalopathy, admitted for suspected abdominal infection and iliacus hematoma.     ACUTE MEDICAL ISSUES  #acute metabolic encephalopathy   #abdominal pain  #concern for choledocholithiasis / cholangitis   #NATALIE on CKD3, baseline cr around 1.0, 1.35 on admission   ::Hx of cholecystectomy  -no recorded fever and no chills per pt.   -CT abd pelvis: increased ductal dilation compared to previous   -Acute metabolic encephalopathy  notably improved in ED after pt received fluids, so it may also be due to dehydration from being on Lasix recently, or from blood loss.   -1L fluids on admission.  Plan:  -ID following  -RUQ showed dilated common bile duct 1 cm and partially visualized dilated main pancreatic duct 5 mm.   -MRCP: main pancreatic duct dilation 7 mm and CBD dilation extending down to ampulla level. C/f ampullary stenosis or occult obstruction ampullary lesion.   -Continue zosyn (1/11-). Blood cultures pending.   -Continue 100/hr.  - Lipase: mildly elevated, amylase: negative  - CRP: elevated 6.96       #hx LLE iliopsoas rupture   #concern for L psoas and iliacus hematoma   #acute anemia, baseline hgb is ~13, 9.7 on admission   ::Follows with ortho, Dr. Duran  -seen on CT abd pelvis w contrast. Hold Eliquis for now. anemia workup.   Plan:  - Transfuse as needed   - H&H: Hgb 10.2, improving  - Hold home Eliquis  - Ortho consulted who did not recommend surgical intervention. Suggesting IR may be consulted for hematoma aspiration.   - PT/OT       #BLE edema   #hx DVT   -Doubt current dvts as pt is taking Eliquis at facility, though unable to verify if she has been getting it.   -No pitting edema to BLEs on admission;   Plan:  -HOLD Eliquis due to concern for hematoma  -Consider ordering an echo to evaluate for heart failure.   -Consider BLE US if she develops pitting edema or other signs of DVT.       #Rash  #RLE wound  ::Low c/f cellulitis (no warmth, ttp)  - Isolated non itchy rash on lower R leg  Plan:  - RLE wound does not look acutely infected. Wound care consult.   - Monitor        RESOLVED MEDICAL ISSUES:      CHRONIC MEDICAL ISSUES:    #HTN - hold valsartan hctz 160-12.5 and metoprolol succinate 50 in setting of possible infection and hematoma   #chronic neuropathy - continue gabapentin 300mg bid as 200 bid due to kidney function   #T2DM - hold home Ozempic, metformin. SSI   #HLD - continue statin   #GERD - continue famotidine  renally dosed         Fluids:  100 ml/hr NS maintenance   Electrolytes: Replete PRN  Nutrition: Fat restricted diet  GI Prophylaxis:  Famotidine  DVT Prophylaxis: Eliquis (held for hematoma), Reason:  Afib   BM:      Access: PIV  Antibiotics:   - Zosyn (1/11-)  Oxygenation: RA    Emergency Contact: Extended Emergency Contact Information  Primary Emergency Contact: Annamaria Austin  Home Phone: [phone]  Relation: Other  Secondary Emergency Contact: Sister Moore  Home Phone: [phone]  Relation: None    Dispo: [facility] .         Arleth Kamara DO  Internal Medicine, PGY-1

## 2025-01-11 NOTE — CONSULTS
Consults  Referred by ADELAIDE Mayorga    Primary MD: Kala Major, APRN-CNP    Reason For Consult  Concern for cholangitis    History Of Present Illness  Donna J Gonda is a 84 y.o. female, hx of obesity, hx of HTN, hx of DM, hx of CKD, hx of DVT, hx of Lt iliopsoas tendon rupture, she was admitted for altered mentation, her WBC were N, CT head without acute changes, the CT abdomen with .intrahepatic biliary dilatation, CBD / pancreatic duct  slightly more dilated than before, the LFT are N, she is unable to provide a hx      Past Medical History  She has no past medical history on file.    Surgical History  She has no past surgical history on file.     Social History     Occupational History    Not on file   Tobacco Use    Smoking status: Never    Smokeless tobacco: Never   Vaping Use    Vaping status: Never Used   Substance and Sexual Activity    Alcohol use: Never    Drug use: Never    Sexual activity: Defer     Travel History   Travel since 12/11/24    No documented travel since 12/11/24          Family History  No family history on file., unable to obtain  Allergies  Patient has no known allergies.     Immunization History   Administered Date(s) Administered    COVID-19, mRNA, LNP-S, PF, 30 mcg/0.3 mL dose 01/04/2021, 01/26/2021, 10/18/2021    Moderna COVID-19 vaccine, 12 years and older (50mcg/0.5mL)(Spikevax) 10/23/2024    Pfizer COVID-19 vaccine, 12 years and older, (30mcg/0.3mL) (Comirnaty) 11/08/2023    Pfizer COVID-19 vaccine, bivalent, age 12 years and older (30 mcg/0.3 mL) 10/19/2022    Pfizer Gray Cap SARS-CoV-2 04/20/2022   Pneumonia and influenza vaccines are planned  Dangelo fall risk 85, preventive protocol was implemented  Depression screen could be done secondary to her mentation    Medications  Home medications:  Medications Prior to Admission   Medication Sig Dispense Refill Last Dose/Taking    acetaminophen (Tylenol) 500 mg capsule Take by mouth every 8 hours if needed.       Eliquis 5 mg tablet  "Take 1 tablet (5 mg) by mouth 2 times a day.       famotidine (Pepcid) 20 mg tablet Take by mouth twice a day.       gabapentin (Neurontin) 300 mg capsule 1 capsule (300 mg) every 12 hours.       lovastatin (Mevacor) 20 mg tablet Take 1 tablet (20 mg) by mouth once daily.       metFORMIN (Glucophage) 500 mg tablet        metoprolol succinate XL (Toprol-XL) 50 mg 24 hr tablet        Ozempic 0.25 mg or 0.5 mg (2 mg/3 mL) pen injector        valsartan-hydrochlorothiazide (Diovan-HCT) 160-12.5 mg tablet         Current medications:  Scheduled medications  [Held by provider] apixaban, 5 mg, oral, BID  famotidine, 20 mg, oral, Daily  gabapentin, 200 mg, oral, BID  insulin lispro, 0-10 Units, subcutaneous, q4h  [Held by provider] metoprolol succinate XL, 50 mg, oral, Daily  piperacillin-tazobactam, 3.375 g, intravenous, q6h  pravastatin, 20 mg, oral, Nightly  sennosides-docusate sodium, 2 tablet, oral, Nightly  [Held by provider] valsartan 160 mg, hydroCHLOROthiazide 12.5 mg for Diovan HCT, , oral, Daily      Continuous medications  sodium chloride 0.9%, 100 mL/hr, Last Rate: 100 mL/hr (01/11/25 0917)      PRN medications  PRN medications: acetaminophen **OR** acetaminophen, dextrose, dextrose, glucagon, glucagon, HYDROmorphone, HYDROmorphone    Review of Systems   Unable to perform ROS: Mental status change        Objective  Range of Vitals (last 24 hours)  Heart Rate:  [85-93]   Temp:  [36.4 °C (97.5 °F)-36.8 °C (98.2 °F)]   Resp:  [12-24]   BP: (106-136)/(45-71)   Height:  [157.5 cm (5' 2\")-157.5 cm (5' 2.01\")]   Weight:  [87 kg (191 lb 12.8 oz)-88 kg (194 lb 0.1 oz)]   SpO2:  [97 %-99 %]   Daily Weight  01/11/25 : 88 kg (194 lb 0.1 oz)    Body mass index is 35.47 kg/m².   Nutritional consult    Physical Exam  Constitutional:       Appearance: She is ill-appearing.   HENT:      Head: Normocephalic and atraumatic.      Mouth/Throat:      Mouth: Mucous membranes are moist.      Pharynx: Oropharynx is clear.   Eyes:      " "Pupils: Pupils are equal, round, and reactive to light.   Cardiovascular:      Rate and Rhythm: Normal rate and regular rhythm.      Heart sounds: Normal heart sounds.   Pulmonary:      Effort: Pulmonary effort is normal.      Breath sounds: Normal breath sounds.   Abdominal:      General: Abdomen is flat. Bowel sounds are normal.      Palpations: Abdomen is soft.   Musculoskeletal:         General: Swelling present.      Cervical back: Normal range of motion.      Comments: stasis          Relevant Results  Outside Hospital Results  reviewed  Labs  Results from last 72 hours   Lab Units 01/11/25  0711 01/11/25  0028   WBC AUTO x10*3/uL 5.9 6.8   HEMOGLOBIN g/dL 8.8* 9.7*   HEMATOCRIT % 26.6* 30.6*   PLATELETS AUTO x10*3/uL 227 222   NEUTROS PCT AUTO %  --  54.9   LYMPHS PCT AUTO %  --  29.9   MONOS PCT AUTO %  --  11.6   EOS PCT AUTO %  --  2.9     Results from last 72 hours   Lab Units 01/11/25  0711 01/11/25  0028 01/10/25  0615   SODIUM mmol/L 137 139 141   POTASSIUM mmol/L 3.6 3.8 4.3   CHLORIDE mmol/L 99 98 102   CO2 mmol/L 30 31 30   BUN mg/dL 31* 36* 29*   CREATININE mg/dL 1.24* 1.35* 1.08*   GLUCOSE mg/dL 137* 198* 102*   CALCIUM mg/dL 9.2 9.7 9.9   ANION GAP mmol/L 12 14 13   EGFR mL/min/1.73m*2 42* 38* 50*   PHOSPHORUS mg/dL 3.9  --  3.8     Results from last 72 hours   Lab Units 01/11/25  0711 01/11/25  0028   ALK PHOS U/L 44 52   BILIRUBIN TOTAL mg/dL 0.5 0.4   PROTEIN TOTAL g/dL 5.8* 6.4   ALT U/L 14 17   AST U/L 20 24   ALBUMIN g/dL 3.0* 3.3*     Estimated Creatinine Clearance: 33.6 mL/min (A) (by C-G formula based on SCr of 1.24 mg/dL (H)).  C-Reactive Protein   Date Value Ref Range Status   01/11/2025 8.29 (H) <1.00 mg/dL Final     Sedimentation Rate   Date Value Ref Range Status   01/11/2025 66 (H) 0 - 30 mm/h Final     No results found for: \"HIV1X2\", \"HIVCONF\", \"MSIAJX6LN\"  No results found for: \"HEPCABINIT\", \"HEPCAB\", \"HCVPCRQUANT\"  Microbiology  Reviewed  Imaging  Reviewed      Assessment/Plan "   Encephalopathy  Dilatated biliary tree, LFT are N  Ruptured Lt iliopsoas tendon     Recommendations :  Continue Zosyn  MRCP is planned  Cultures  Follow the inflammatory markers  Follow the LFT  Viral screen, respiratory    I spent minutes in the professional and overall care of this patient.      Radha Dash MD

## 2025-01-11 NOTE — H&P
History Of Present Illness  Donna J Gonda is a 84 y.o. female with hx DVT on Eliquis, hx R breast ca s/p lumpectomy, CKD3a, dementia baseline A&O x3, chronic low back pain, HTN, HLD, T2DM, left iliopsoas tendon rupture who presents for metabolic encephalopathy, admitted for suspected abdominal infection and iliacus hematoma. History is limited due to pt being a poor historian and still encephalopathic and unable to contact her residence [facility] after multiple attempts.     Per ED note, pt is brought in for increased confusion / AMS. Her baseline is noted to be A&O x3 though she has some history of dementia. She was at an office visit 1/9/25 and she had been having increased confusion since 1/7/25. Apparently she has acutely needed more assistance with ambulation and increased tiredness which is new. She has had increased swelling to her legs last few days.   There was a report that since 6 PM 1/10/25, pt had become increasingly confused and not talking to anyone.   At bedside on admission, pt is A&O x3 though still appears a bit encephalopathic, slow to respond to the year. She says she has been having abd pain for a few weeks, and repeatedly requests pain medications. When asked where specifically her pain is, she says “all over”. Denies remembering having fevers, chills, dysuria, diarrhea, constipation (last bm 1 day ago).    She has complete left iliopsoas tendon rupture a few months ago with moderate iliopsoas tendinosis and muscular edema on 10/2024 MRI scan. She has a repeat MRI pending. Pt does note chronic left leg pain on admission and has a hard time moving her leg due to pain.   On review of paper charts from [facility] nursing [facility], done 1/9/25, pt was given new PO Lasix (60mg 2 days, 40mg 3 days, etc) starting 1/9/25 by SNF primary care for BLE edema.     In the ED vitals 97.5f, 85 hr, 16 rr, 136/71, 98% on room air.   Labs 139 sodium, 31 bicarb, 1.35 cr (baseline around 1.05),  lfts wnl, 192 ck, 16 ammonia, 1.5 lactate, 51 bnp, 12 trop, 198 glucose.   Wbc 6.8, 9.7 hgb (baseline 13), 222 plt. 2x blood cultures obtained. Ua without signs of infection.   Ekg sinus 86 466 qtcb RBBB, first degree av block, left anterior fascicular block. No acute st elevation.   CT head no acute process. CXR no acute process.     Allergies: nkda   Surgical hx: R breast lumpectomy. Hx cholecystectomy.   Family hx: htn sister, cancer father   Social hx: no tobacco use. Social alcohol .     Past Medical History  She has no past medical history on file.    Surgical History  She has no past surgical history on file.     Social History  She reports that she has never smoked. She has never used smokeless tobacco. She reports that she does not drink alcohol and does not use drugs.    Family History  No family history on file.     Allergies  Patient has no known allergies.    Review of Systems   Reason unable to perform ROS: confused.        Physical Exam  Vitals reviewed.   Constitutional:       Comments: Mild distress due to pain    HENT:      Head: Normocephalic and atraumatic.   Eyes:      Extraocular Movements: Extraocular movements intact.      Conjunctiva/sclera: Conjunctivae normal.   Cardiovascular:      Rate and Rhythm: Normal rate and regular rhythm.      Heart sounds: No murmur heard.     No friction rub. No gallop.   Pulmonary:      Effort: Pulmonary effort is normal.      Breath sounds: Normal breath sounds. No wheezing, rhonchi or rales.   Abdominal:      General: Bowel sounds are normal.      Palpations: Abdomen is soft.      Tenderness: There is abdominal tenderness.      Comments: RUQ abd ttp    Musculoskeletal:      Cervical back: Neck supple.      Comments: Ble nonpitting edema   Pain on ROM of LLE    Skin:     General: Skin is warm and dry.      Findings: No bruising or rash.   Neurological:      Mental Status: She is alert and oriented to person, place, and time.      Comments: Answering questions,  following commands. Moving all extremities.    Psychiatric:         Mood and Affect: Mood is anxious.          Last Recorded Vitals  /67 (BP Location: Right arm, Patient Position: Lying)   Pulse 93   Temp 36.4 °C (97.5 °F) (Temporal)   Resp 18   Wt 88 kg (194 lb 0.1 oz)   SpO2 99%     Relevant Results      Scheduled medications  [Held by provider] apixaban, 5 mg, oral, BID  famotidine, 20 mg, oral, Daily  gabapentin, 200 mg, oral, BID  insulin lispro, 0-10 Units, subcutaneous, q6h  lactated Ringer's, 500 mL, intravenous, Once  [Held by provider] metoprolol succinate XL, 50 mg, oral, Daily  piperacillin-tazobactam, 3.375 g, intravenous, q6h  pravastatin, 20 mg, oral, Nightly  sennosides-docusate sodium, 2 tablet, oral, Nightly  [Held by provider] valsartan 160 mg, hydroCHLOROthiazide 12.5 mg for Diovan HCT, , oral, Daily      Continuous medications  sodium chloride 0.9%, 75 mL/hr      PRN medications  PRN medications: acetaminophen **OR** acetaminophen, dextrose, dextrose, glucagon, glucagon, HYDROmorphone, HYDROmorphone  CT abdomen pelvis w IV contrast    Result Date: 1/11/2025  Interpreted By:  Pravin David, STUDY: CT ABDOMEN PELVIS W IV CONTRAST;  1/11/2025 2:37 am   INDICATION: Signs/Symptoms:RUQ abd pain.     COMPARISON: CT abdomen and pelvis dated 12/17/2013; MRI of the lumbar spine dated 09/12/2023.   ACCESSION NUMBER(S): [id number]   ORDERING CLINICIAN: RENALDO LOERA   TECHNIQUE: Contiguous axial images of the abdomen and pelvis were obtained after the intravenous administration of 75 mL of Omnipaque 350 iodinated contrast. Coronal and sagittal reformatted images were reconstructed from the axial data.   FINDINGS: LOWER CHEST: Atelectasis/scarring are present in the lower lobes bilaterally, without evidence of consolidation or pleural effusion. 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   Heart is normal in size without pericardial effusion.    Distal esophagus is unremarkable in appearance.     ABDOMEN/PELVIS:   Images are somewhat degraded by motion.   ABDOMINAL WALL: There is a ventral abdominal wall hernia along the midline. No acute abnormalities identified in the cutaneous tissues of the   LIVER: No acute hepatic parenchymal abnormality is present.   BILE DUCTS: There is dilatation of the central intrahepatic biliary tree and common bile duct, somewhat increased from prior study in 2013.   GALLBLADDER: Gallbladder is surgically absent.   PANCREAS: No peripancreatic stranding is present. The proximal pancreatic duct is dilated, measuring up to 6 mm in diameter, previously measuring 4 mm in December of 2013.   SPLEEN: No acute splenic abnormality is present.   ADRENALS: Bilateral adrenal glands are unremarkable in appearance.   KIDNEYS, URETERS, BLADDER: Kidneys are symmetric in size and enhancement without hydronephrosis or radiopaque nephrolithiasis. Several exophytic cysts are present bilaterally, largest measuring up to 4.7 cm in size.   Ureters are not dilated. No bladder wall thickening is present.   REPRODUCTIVE ORGANS: Uterus is surgically absent. No adnexal masses are identified.   VESSELS: Mild vascular calcifications are present in the abdominal aorta. No acute vascular abnormality is identified. IVC is flattened.   RETROPERITONEUM/LYMPH NODES: The left psoas and iliacus muscles (series 201, image 78 and 113) demonstrate asymmetric enlargement compared to the contralateral right side, with somewhat indistinct appearance of the left iliacus, new since prior MRI of the lumbar spine on 09/12/2023. No enlarged lymph nodes.   BOWEL/MESENTERY/PERITONEUM: Scattered diverticula are present in the descending and sigmoid colon without evidence of acute diverticulitis. No inflammatory bowel wall thickening or dilatation. Appendix is not definitely identified, no secondary signs of acute appendicitis present in the right lower quadrant.   No ascites,  free air, or fluid collection.     MUSCULOSKELETAL: No acute osseous abnormality. No suspicious osseous lesion. Multilevel degenerative changes of the lower thoracic and lumbar spine without compression fracture or high-grade stenosis.       1. New central intrahepatic biliary dilatation with mild increased distention of the common bile duct compared to prior CT in 2013, now measuring up to 11 mm in diameter, without evidence of choledocholithiasis. Additionally, there is slight increase in the dilatation of the pancreatic duct compared to 2013, now measuring up to 6 mm compared to 4 mm previously. Finding is of unclear etiology, although differential includes a mass at the sphincter of Oddi, radiolucent choledocholithiasis in the distal CBD, and ascending cholangitis. 2. Asymmetric enlargement of the left psoas and iliacus muscles compared to the contralateral right side, new since prior MRI of the lumbar spine on 09/12/2023, with somewhat indistinct appearance of the iliacus near the left hip. Finding is suspicious for underlying hematoma of indeterminate chronicity, possibly subacute/chronic, although other infiltrative processes are not entirely excluded. Correlate with any history of trauma. 3. Flattened IVC. Correlate with fluid volume status and hypovolemia. 4. Numerous diverticula are present in the descending and sigmoid colon without evidence of acute diverticulitis.     MACRO: None.   Signed by: Pravin David 1/11/2025 3:06 AM Dictation workstation:   [id number]    XR chest 1 view    Result Date: 1/11/2025  Interpreted By:  Jacob Reyes, STUDY: XR CHEST 1 VIEW;  1/11/2025 1:05 am   INDICATION: Signs/Symptoms:AMS.   COMPARISON: Chest x-ray 03/29/2011   ACCESSION NUMBER(S): [id number]   ORDERING CLINICIAN: RENALDO LOERA   FINDINGS: Right-sided MediPort terminates in the SVC. Overlying leads noted.   CARDIOMEDIASTINAL SILHOUETTE: Cardiomediastinal silhouette is stable in size and configuration.    LUNGS: No consolidation, pleural effusion or pneumothorax. Left basilar atelectasis and or scarring.   ABDOMEN: No remarkable upper abdominal findings.   BONES: Multilevel degenerative changes of the spine. Bilateral shoulder osteoarthrosis.       No acute cardiopulmonary process.   MACRO: None   Signed by: Jacob Reyes 1/11/2025 1:10 AM Dictation workstation:   [id number]    CT head wo IV contrast    Result Date: 1/11/2025  Interpreted By:  Jacob Reyes, STUDY: CT HEAD WO IV CONTRAST;  1/11/2025 1:00 am   INDICATION: Signs/Symptoms:ams.   COMPARISON: None.   ACCESSION NUMBER(S): [id number]   ORDERING CLINICIAN: RENALDO LOERA   TECHNIQUE: Axial noncontrast CT images of the head.   FINDINGS: BRAIN PARENCHYMA: Gray-white matter interfaces are preserved. No mass, mass effect or midline shift. Mild deep and periventricular white matter hypodensities are nonspecific, but favored to represent chronic small vessel ischemic changes.   HEMORRHAGE: No acute intracranial hemorrhage. VENTRICLES and EXTRA-AXIAL SPACES: Mild-to-moderate volume loss with prominence of the ventricles and sulci. EXTRACRANIAL SOFT TISSUES: Within normal limits. PARANASAL SINUSES/MASTOIDS: The visualized paranasal sinuses and mastoid air cells are aerated. CALVARIUM: No depressed skull fracture. No destructive osseous lesion.   OTHER FINDINGS: Atherosclerotic calcification of the carotid siphons and vertebral arteries.       No acute intracranial abnormality. If symptoms persist or there is high clinical suspicion further evaluation with MRI can be considered.   Chronic changes as noted above.   MACRO: None   Signed by: Jacob Reyes 1/11/2025 1:07 AM Dictation workstation:   [id number]   Results for orders placed or performed during the hospital encounter of 01/11/25 (from the past 24 hours)   CBC and Auto Differential   Result Value Ref Range    WBC 6.8 4.4 - 11.3 x10*3/uL    nRBC 0.0 0.0 - 0.0 /100 WBCs    RBC 3.23 (L) 4.00 - 5.20 x10*6/uL     Hemoglobin 9.7 (L) 12.0 - 16.0 g/dL    Hematocrit 30.6 (L) 36.0 - 46.0 %    MCV 95 80 - 100 fL    MCH 30.0 26.0 - 34.0 pg    MCHC 31.7 (L) 32.0 - 36.0 g/dL    RDW 13.2 11.5 - 14.5 %    Platelets 222 150 - 450 x10*3/uL    Neutrophils % 54.9 40.0 - 80.0 %    Immature Granulocytes %, Automated 0.1 0.0 - 0.9 %    Lymphocytes % 29.9 13.0 - 44.0 %    Monocytes % 11.6 2.0 - 10.0 %    Eosinophils % 2.9 0.0 - 6.0 %    Basophils % 0.6 0.0 - 2.0 %    Neutrophils Absolute 3.72 1.60 - 5.50 x10*3/uL    Immature Granulocytes Absolute, Automated 0.01 0.00 - 0.50 x10*3/uL    Lymphocytes Absolute 2.03 0.80 - 3.00 x10*3/uL    Monocytes Absolute 0.79 0.05 - 0.80 x10*3/uL    Eosinophils Absolute 0.20 0.00 - 0.40 x10*3/uL    Basophils Absolute 0.04 0.00 - 0.10 x10*3/uL   Comprehensive Metabolic Panel   Result Value Ref Range    Glucose 198 (H) 74 - 99 mg/dL    Sodium 139 136 - 145 mmol/L    Potassium 3.8 3.5 - 5.3 mmol/L    Chloride 98 98 - 107 mmol/L    Bicarbonate 31 21 - 32 mmol/L    Anion Gap 14 10 - 20 mmol/L    Urea Nitrogen 36 (H) 6 - 23 mg/dL    Creatinine 1.35 (H) 0.50 - 1.05 mg/dL    eGFR 38 (L) >60 mL/min/1.73m*2    Calcium 9.7 8.6 - 10.3 mg/dL    Albumin 3.3 (L) 3.4 - 5.0 g/dL    Alkaline Phosphatase 52 33 - 136 U/L    Total Protein 6.4 6.4 - 8.2 g/dL    AST 24 9 - 39 U/L    Bilirubin, Total 0.4 0.0 - 1.2 mg/dL    ALT 17 7 - 45 U/L   Ammonia   Result Value Ref Range    Ammonia 16 16 - 53 umol/L   TSH with reflex to Free T4 if abnormal   Result Value Ref Range    Thyroid Stimulating Hormone 1.78 0.44 - 3.98 mIU/L   B-Type Natriuretic Peptide   Result Value Ref Range    BNP 51 0 - 99 pg/mL   Creatine Kinase   Result Value Ref Range    Creatine Kinase 192 0 - 215 U/L   Acute Toxicology Panel, Blood   Result Value Ref Range    Acetaminophen <10.0 10.0 - 30.0 ug/mL    Salicylate  <3 4 - 20 mg/dL    Alcohol <10 <=10 mg/dL   Troponin I, High Sensitivity, Initial   Result Value Ref Range    Troponin I, High Sensitivity 12 0 - 13  ng/L   Sedimentation rate, automated   Result Value Ref Range    Sedimentation Rate 66 (H) 0 - 30 mm/h   C-reactive protein   Result Value Ref Range    C-Reactive Protein 8.29 (H) <1.00 mg/dL   DRUG SCREEN,URINE   Result Value Ref Range    Amphetamine Screen, Urine Presumptive Negative Presumptive Negative    Barbiturate Screen, Urine Presumptive Negative Presumptive Negative    Benzodiazepines Screen, Urine Presumptive Negative Presumptive Negative    Cannabinoid Screen, Urine Presumptive Negative Presumptive Negative    Cocaine Metabolite Screen, Urine Presumptive Negative Presumptive Negative    Fentanyl Screen, Urine Presumptive Negative Presumptive Negative    Opiate Screen, Urine Presumptive Negative Presumptive Negative    Oxycodone Screen, Urine Presumptive Negative Presumptive Negative    PCP Screen, Urine Presumptive Negative Presumptive Negative    Methadone Screen, Urine Presumptive Negative Presumptive Negative   Urinalysis with Reflex Microscopic   Result Value Ref Range    Color, Urine Colorless (N) Light-Yellow, Yellow, Dark-Yellow    Appearance, Urine Clear Clear    Specific Gravity, Urine 1.011 1.005 - 1.035    pH, Urine 5.5 5.0, 5.5, 6.0, 6.5, 7.0, 7.5, 8.0    Protein, Urine NEGATIVE NEGATIVE, 10 (TRACE), 20 (TRACE) mg/dL    Glucose, Urine Normal Normal mg/dL    Blood, Urine NEGATIVE NEGATIVE    Ketones, Urine NEGATIVE NEGATIVE mg/dL    Bilirubin, Urine NEGATIVE NEGATIVE    Urobilinogen, Urine Normal Normal mg/dL    Nitrite, Urine NEGATIVE NEGATIVE    Leukocyte Esterase, Urine NEGATIVE NEGATIVE   Lactate   Result Value Ref Range    Lactate 1.5 0.4 - 2.0 mmol/L   Troponin, High Sensitivity, 1 Hour   Result Value Ref Range    Troponin I, High Sensitivity 9 0 - 13 ng/L   BLOOD GAS VENOUS   Result Value Ref Range    POCT pH, Venous 7.44 (H) 7.33 - 7.43 pH    POCT pCO2, Venous 47 41 - 51 mm Hg    POCT pO2, Venous 35 35 - 45 mm Hg    POCT SO2, Venous 59 45 - 75 %    POCT Oxy Hemoglobin, Venous 57.8 45.0  - 75.0 %    POCT Base Excess, Venous 6.6 (H) -2.0 - 3.0 mmol/L    POCT HCO3 Calculated, Venous 31.9 (H) 22.0 - 26.0 mmol/L    Patient Temperature      FiO2 21 %        Assessment/Plan   Assessment & Plan  Altered mental status, unspecified altered mental status type      84f with hx DVT on Eliquis, hx R breast ca s/p lumpectomy, CKD3a, dementia baseline A&O x3, chronic low back pain, HTN, HLD, T2DM, left iliopsoas tendon rupture who presents for metabolic encephalopathy, admitted for suspected abdominal infection and iliacus hematoma.     #acute metabolic encephalopathy   #abdominal pain  #concern for choledocholithiasis / cholangitis   #NATALIE on CKD3, baseline cr around 1.0, 1.35 on admission   -no recorded fever and no chills per pt. she does have hx of cholecystectomy though has increased ductal dilation compared to before on CT abd pelvis, along with abd tenderness. Acute metabolic encephalopathy notably improved in ED after pt received fluids, so it may also be due to dehydration from being on Lasix recently, or from blood loss.   -will order RUQ and MRCP to rule out choledocholithiasis   -continue zosyn for now (1/11-). Blood cultures pending.   -1L fluids on admission. Continue 75/hr. pain regimen   -ID consult for cholangitis eval. consider GI consult on Monday or during weekend if pt acutely decompensates     #hx LLE iliopsoas rupture   #concern for L psoas and iliacus hematoma   #acute anemia, baseline hgb is ~13, 9.7 on admission   -seen on CT abd pelvis w contrast. Hold Eliquis for now. anemia workup. Transfuse as needed     #BLE edema   #hx DVT   -Doubt current dvts as pt is taking Eliquis at facility, though unable to verify if she has been getting it. Pt does not have pitting edema to BLEs on admission; consider BLE US if she develops pitting edema or other signs of DVT.   -hold Eliquis due to concern for hematoma and in case pt gets procedure.   -consider ordering an echo to evaluate for heart failure.      #RLE wound - does not look acutely infected. Wound care consult.   #HTN - hold valsartan hctz 160-12.5 and metoprolol succinate 50 in setting of possible infection and hematoma   #chronic neuropathy - continue gabapentin 300mg bid as 200 bid due to kidney function   #T2DM - hold home Ozempic, metformin. SSI   #HLD - continue statin   #GERD - continue famotidine renally dosed       Fluids: 1L iv fluids, 75/hr after (1/11-)   Nutrition: NPO   GI prophylaxis: h2 blocker   VTE prophylaxis: hold due to suspected hematoma   O2: room air   Code Status: DNR and DNI per pt on admission     Disposition: pt admitted for acute metabolic encephalopathy which has improved, now working up abd pain and possible hematoma, estimated LOS > 48h          Dulce Champion DO  PGY3 IM

## 2025-01-11 NOTE — PROGRESS NOTES
01/11/25 0939   Discharge Planning   Living Arrangements Other (Comment)   Support Systems [facility]   Assistance Needed WC/ walker/ ADL assist. From [facility].   Type of Residence Nursing home/residential care   Who is requesting discharge planning? Provider   Home or Post Acute Services Post acute facilities (Rehab/SNF/etc)   Type of Post Acute Facility Services Skilled nursing   Expected Discharge Disposition SNF   Does the patient need discharge transport arranged? Yes   RoundTrip coordination needed? Yes   Has discharge transport been arranged? No   Patient Choice   Provider Choice list and CMS website (https://medicare.gov/care-compare#search) for post-acute Quality and Resource Measure Data were provided and reviewed with: Patient   Patient / Family choosing to utilize agency / facility established prior to hospitalization Yes   Intensity of Service   Intensity of Service 0-30 min     1/11/2025: Patient admitted with mental status change. Per facility, patient has had steady physical decline over several months and new cognitive decline. Patient was in assisted living of [place] and had transitioned to skilled about 4 months ago. Plan is to returned skilled to [facility].

## 2025-01-11 NOTE — HOSPITAL COURSE
Brief HPI:    84 y.o. female with PMHX DVT on Eliquis, hx R breast ca s/p lumpectomy, CKD3a, dementia baseline A&O x3, chronic low back pain, HTN, HLD, T2DM, left iliopsoas tendon rupture who presents from [facility] for metabolic encephalopathy, admitted for suspected abdominal infection and iliacus hematoma.   ED Course:    In the ED vitals 97.5f, 85 hr, 16 rr, 136/71, 98% on room air.   Labs 139 sodium, 31 bicarb, 1.35 cr (baseline around 1.05), lfts wnl, 192 ck, 16 ammonia, 1.5 lactate, 51 bnp, 12 trop, 198 glucose.   Wbc 6.8, 9.7 hgb (baseline 13), 222 plt. 2x blood cultures obtained. Ua without signs of infection.     Ekg sinus 86 466 qtcb RBBB, first degree av block, left anterior fascicular block. No acute st elevation.   CT head no acute process. CXR no acute process.     Floors:  Upon admission, a RUQ and MRCP was ordered for c/f of choledocholithiasis/cholangitis. The RUQ US showed dilated common bile duct 1 cm and partially visualized dilated main pancreatic duct 5 mm. The MRCP revealed the main pancreatic duct dilation 7 mm and CBD dilation extending down to ampulla level. C/f ampullary stenosis or occult obstruction ampullary lesion. Additionally, GI was consulted and recommend outpatient follow up with advanced GI along with outpatient EUS/ERCP. Pt was treated with Zosyn for concern of cholangitis and transitioned to Augmentin upon discharge. For pt's LLE iliopsoas rupture, Eliquis was initially and orthopedic surgery was consulted. They recommended no surgical intervention at this point and Eliquis was resumed. . During her stay, pt was found to be in an NATALIE, presumed 2/2 dehydration. She was provided with fluids after which creatinine returned to baseline. Wound care was placed on consult for RLE wound. It did not appear acutely infected so it was monitored. Pt was ultimately medically stable and cleared for discharged with recommendations, to complete antibiotics course and follow  with Dr. Marcano, advanced GI prior to completing EUS/ERCP.

## 2025-01-11 NOTE — CONSULTS
Reason For Consult  Left iliopsoas rupture    History Of Present Illness  Donna J Gonda is a 84 y.o. female presenting with per patient a 3-week history of left lower extremity pain.  Patient is currently undergoing workup for the left lower extremity pain by my partner Dr. Alexander Duran.  Was scheduled for MRI of the left knee.  Patient states she has pain with any type of motion of the lower extremity it radiates down the left leg anteriorly down to the foot.  Occasionally posterior thigh pain.  Longstanding history of chronic back pain.  In October she was reportedly worked up for right lower extremity pain and was diagnosed with a right iliopsoas tendon rupture.  Exact treatment rendered for this unclear.     Past Medical History  Hypertension, hyperlipidemia, type 2 diabetes, deep vein thrombosis, right breast cancer, chronic kidney disease, and some dementia    Surgical History  She has no past surgical history on file.     Social History  She reports that she has never smoked. She has never used smokeless tobacco. She reports that she does not drink alcohol and does not use drugs.    Family History  No family history on file.     Allergies  Patient has no known allergies.    Review of Systems  As per chart     Physical Exam  Patient is sleeping supine.  She is arousable.  She is alert and oriented.  Answers questions appropriately.  Left lower extremity with equal leg length to the right.  No signs of deformity.  Logroll is negative.  Attempts at straight leg raising passively increases patient's pain.  Thigh and calf are supple.  There is tenderness to palpation along the left groin extending towards the abdomen.  Passive range of motion of the hip with regards to abduction and flexion does increase pain.  Pelvic rock is negative.  Sensation grossly intact.  Chest with symmetric inhalation and exhalation.     Last Recorded Vitals  Blood pressure 146/76, pulse 84, temperature 35.9 °C (96.6 °F), temperature  "source Temporal, resp. rate 18, height 1.575 m (5' 2.01\"), weight 88 kg (194 lb 0.1 oz), SpO2 100%.    Relevant Results  MRCP pancreas w and wo IV contrast    Result Date: 1/11/2025  Interpreted By:  Adam Del Rosario, STUDY: MRCP PANCREAS W AND WO IV CONTRAST;  1/11/2025 1:10 pm   INDICATION: Signs/Symptoms:ruq tenderness concern for choledocho / cholangitis.     COMPARISON: Right upper quadrant ultrasound and CT abdomen/pelvis of same date.   ACCESSION NUMBER(S): [id number]   ORDERING CLINICIAN: DERRICK FREEDMAN   TECHNIQUE: Multisequence multiplanar imaging of the abdomen was performed with attention to the pancreas. MRCP sequences with MIPS reformations were included. Sequences were obtained both without as well as following intravenous administration of 17 mL Dotarem contrast.   FINDINGS: LIVER: Liver is not significantly enlarged there is some relative atrophy of the left hepatic lobe. Hepatic parenchyma is isointense on opposed phase sequences. No focal hepatic lesion is seen.   BILE DUCTS: There is diffuse dilation of the common bile duct measuring up to 1 cm in diameter as well as mild-moderate diffuse intrahepatic biliary dilation. No filling defect is seen within the common bile duct. No discrete obstructing biliary lesion is identified.   GALLBLADDER: Surgically absent.   PANCREAS: There is diffuse dilation of the main pancreatic duct measuring up to 7 mm in diameter in the ventral segment of the pancreatic head and 4 mm in diameter in the pancreatic body and tail. Multiple mildly dilated ductal side branches and subcentimeter cystic foci are seen within pancreatic head as well as the pancreatic distal body and tail. There is conventional pancreatic ductal anatomy in the dilated main pancreatic duct extends to the major papilla level. No enhancing pancreatic lesion is seen.   SPLEEN: Spleen is not enlarged. No discrete splenic lesion.   ADRENAL GLANDS: Unremarkable.   KIDNEYS: There is a left renal mid " aspect posterior T1 intermediate signal intensity, T2 mixed signal intensity mass measuring 2 x 2 x 2 cm. Following contrast administration there question of mild intrinsic enhancement of this lesion concerning for a small renal neoplasm.   A right renal upper pole round 1.1 cm T1 hyperintense, T2 intermediate signal intensity cortical lesion does not show enhancement compatible with a small complicated cyst containing debris.   A right renal mid lateral aspect round partially exophytic T1 hypointense, T2 hyperintense nonenhancing 4.7 cm cyst is present. There is also a right renal lower pole anterior partially exophytic 1.7 cm T2 hyperintense cyst. Multiple additional subcentimeter T2 hyperintense renal cortical cysts are present bilaterally. No hydronephrosis bilaterally.   LYMPH NODES: No lymphadenopathy is seen.   ABDOMINAL VESSELS: No aortic aneurysm. IVC is unremarkable.   BOWEL: Included portions of large and small bowel are not abnormally dilated.   PERITONEUM/RETROPERITONEUM: No free fluid is seen.   BONES AND LOWER THORAX: There is partial visualization of thoracolumbar mild S-shaped scoliosis and multilevel degenerative changes of the spine.       1.  Mild-moderate diffuse intrahepatic biliary dilation as well as diffuse dilation of the common bile duct measuring 1 cm in diameter. No biliary intraductal filling defect. 2. Diffuse dilation of the main pancreatic duct measuring up to 7 mm in diameter at the pancreatic head level. Multiple mildly dilated pancreatic ductal side branches and subcentimeter cystic foci throughout the pancreas. No enhancing pancreatic lesion identified. 3. Both the dilated main pancreatic duct and dilated common bile duct extend to the ampulla level. Ampullary stenosis or occult obstructing ampullary lesion remain considerations. 4. Left renal mid posterior aspect indeterminate 2 cm T2 mixed signal intensity mass with possible mild intrinsic enhancement concerning for a small  renal neoplasm. 5. Multiple additional varying sized renal cysts bilaterally as well as a right renal upper pole 1.1 cm nonenhancing complicated cyst containing debris.     MACRO: None   Signed by: Adam Del Rosario 1/11/2025 3:00 PM Dictation workstation:   [id number]    US right upper quadrant    Result Date: 1/11/2025  Interpreted By:  Adam Del Rosario, STUDY: US RIGHT UPPER QUADRANT;  1/11/2025 9:01 am   INDICATION: Signs/Symptoms:RUQ pain eval for choledocho. hx cholecystectomy.     COMPARISON: CT abdomen/pelvis of same date.   ACCESSION NUMBER(S): [id number]   ORDERING CLINICIAN: DERRICK FREEDMAN   TECHNIQUE: Real-time sonographic evaluation of the right upper quadrant was performed.   FINDINGS: LIVER: The liver is homogeneous in echotexture. No focal hepatic lesion is identified.  Liver measures 14.6 cm in sagittal dimension.   GALLBLADDER: Surgically absent.   BILIARY TREE: Common bile duct is dilated measuring 1 cm in diameter. Distal common bile duct is obscured by bowel gas. Intrahepatic biliary ductal dilation also present.   PANCREAS: Pancreatic visualization is limited due to obscuration from bowel gas. There is dilation of the main pancreatic duct measuring 5 mm in diameter at the pancreatic head level similar to CT.   RIGHT KIDNEY: Right kidney measures  10cm in length.  No right hydronephrosis is seen. Right renal mid aspect round hypoechoic partially exophytic cyst measures 4.6 x 4.3 x 4.8 cm.       Intrahepatic and extrahepatic biliary dilation. Dilated common bile duct measuring 1 cm in diameter as well as partially visualized dilated main pancreatic duct measuring 5 mm in diameter. Distal common bile duct obscured by bowel gas. Ampullary level obstructing process can not be excluded. MRCP could be considered for further evaluation.   No focal hepatic abnormality demonstrated.   MACRO: None.   Signed by: Adam Del Rosario 1/11/2025 10:16 AM Dictation workstation:   [id number]    CT abdomen pelvis w  IV contrast    Result Date: 1/11/2025  Interpreted By:  Pravin David, STUDY: CT ABDOMEN PELVIS W IV CONTRAST;  1/11/2025 2:37 am   INDICATION: Signs/Symptoms:RUQ abd pain.     COMPARISON: CT abdomen and pelvis dated 12/17/2013; MRI of the lumbar spine dated 09/12/2023.   ACCESSION NUMBER(S): [id number]   ORDERING CLINICIAN: RENALDO LOERA   TECHNIQUE: Contiguous axial images of the abdomen and pelvis were obtained after the intravenous administration of 75 mL of Omnipaque 350 iodinated contrast. Coronal and sagittal reformatted images were reconstructed from the axial data.   FINDINGS: LOWER CHEST: Atelectasis/scarring are present in the lower lobes bilaterally, without evidence of consolidation or pleural effusion. 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   Heart is normal in size without pericardial effusion.   Distal esophagus is unremarkable in appearance.     ABDOMEN/PELVIS:   Images are somewhat degraded by motion.   ABDOMINAL WALL: There is a ventral abdominal wall hernia along the midline. No acute abnormalities identified in the cutaneous tissues of the   LIVER: No acute hepatic parenchymal abnormality is present.   BILE DUCTS: There is dilatation of the central intrahepatic biliary tree and common bile duct, somewhat increased from prior study in 2013.   GALLBLADDER: Gallbladder is surgically absent.   PANCREAS: No peripancreatic stranding is present. The proximal pancreatic duct is dilated, measuring up to 6 mm in diameter, previously measuring 4 mm in December of 2013.   SPLEEN: No acute splenic abnormality is present.   ADRENALS: Bilateral adrenal glands are unremarkable in appearance.   KIDNEYS, URETERS, BLADDER: Kidneys are symmetric in size and enhancement without hydronephrosis or radiopaque nephrolithiasis. Several exophytic cysts are present bilaterally, largest measuring up to 4.7 cm in size.   Ureters are not dilated. No bladder wall  thickening is present.   REPRODUCTIVE ORGANS: Uterus is surgically absent. No adnexal masses are identified.   VESSELS: Mild vascular calcifications are present in the abdominal aorta. No acute vascular abnormality is identified. IVC is flattened.   RETROPERITONEUM/LYMPH NODES: The left psoas and iliacus muscles (series 201, image 78 and 113) demonstrate asymmetric enlargement compared to the contralateral right side, with somewhat indistinct appearance of the left iliacus, new since prior MRI of the lumbar spine on 09/12/2023. No enlarged lymph nodes.   BOWEL/MESENTERY/PERITONEUM: Scattered diverticula are present in the descending and sigmoid colon without evidence of acute diverticulitis. No inflammatory bowel wall thickening or dilatation. Appendix is not definitely identified, no secondary signs of acute appendicitis present in the right lower quadrant.   No ascites, free air, or fluid collection.     MUSCULOSKELETAL: No acute osseous abnormality. No suspicious osseous lesion. Multilevel degenerative changes of the lower thoracic and lumbar spine without compression fracture or high-grade stenosis.       1. New central intrahepatic biliary dilatation with mild increased distention of the common bile duct compared to prior CT in 2013, now measuring up to 11 mm in diameter, without evidence of choledocholithiasis. Additionally, there is slight increase in the dilatation of the pancreatic duct compared to 2013, now measuring up to 6 mm compared to 4 mm previously. Finding is of unclear etiology, although differential includes a mass at the sphincter of Oddi, radiolucent choledocholithiasis in the distal CBD, and ascending cholangitis. 2. Asymmetric enlargement of the left psoas and iliacus muscles compared to the contralateral right side, new since prior MRI of the lumbar spine on 09/12/2023, with somewhat indistinct appearance of the iliacus near the left hip. Finding is suspicious for underlying hematoma of  indeterminate chronicity, possibly subacute/chronic, although other infiltrative processes are not entirely excluded. Correlate with any history of trauma. 3. Flattened IVC. Correlate with fluid volume status and hypovolemia. 4. Numerous diverticula are present in the descending and sigmoid colon without evidence of acute diverticulitis.     MACRO: None.   Signed by: Pravin David 1/11/2025 3:06 AM Dictation workstation:   [id number]    XR chest 1 view    Result Date: 1/11/2025  Interpreted By:  Jacob Reyes, STUDY: XR CHEST 1 VIEW;  1/11/2025 1:05 am   INDICATION: Signs/Symptoms:AMS.   COMPARISON: Chest x-ray 03/29/2011   ACCESSION NUMBER(S): [id number]   ORDERING CLINICIAN: RENALDO LOERA   FINDINGS: Right-sided MediPort terminates in the SVC. Overlying leads noted.   CARDIOMEDIASTINAL SILHOUETTE: Cardiomediastinal silhouette is stable in size and configuration.   LUNGS: No consolidation, pleural effusion or pneumothorax. Left basilar atelectasis and or scarring.   ABDOMEN: No remarkable upper abdominal findings.   BONES: Multilevel degenerative changes of the spine. Bilateral shoulder osteoarthrosis.       No acute cardiopulmonary process.   MACRO: None   Signed by: Jacob Reyes 1/11/2025 1:10 AM Dictation workstation:   [id number]    CT head wo IV contrast    Result Date: 1/11/2025  Interpreted By:  Jacob Reyes, STUDY: CT HEAD WO IV CONTRAST;  1/11/2025 1:00 am   INDICATION: Signs/Symptoms:ams.   COMPARISON: None.   ACCESSION NUMBER(S): [id number]   ORDERING CLINICIAN: RENALDO LOERA   TECHNIQUE: Axial noncontrast CT images of the head.   FINDINGS: BRAIN PARENCHYMA: Gray-white matter interfaces are preserved. No mass, mass effect or midline shift. Mild deep and periventricular white matter hypodensities are nonspecific, but favored to represent chronic small vessel ischemic changes.   HEMORRHAGE: No acute intracranial hemorrhage. VENTRICLES and EXTRA-AXIAL SPACES: Mild-to-moderate volume loss  with prominence of the ventricles and sulci. EXTRACRANIAL SOFT TISSUES: Within normal limits. PARANASAL SINUSES/MASTOIDS: The visualized paranasal sinuses and mastoid air cells are aerated. CALVARIUM: No depressed skull fracture. No destructive osseous lesion.   OTHER FINDINGS: Atherosclerotic calcification of the carotid siphons and vertebral arteries.       No acute intracranial abnormality. If symptoms persist or there is high clinical suspicion further evaluation with MRI can be considered.   Chronic changes as noted above.   MACRO: None   Signed by: Jacob Reyes 1/11/2025 1:07 AM Dictation workstation:   [id number]    Scheduled medications  [Held by provider] apixaban, 5 mg, oral, BID  famotidine, 20 mg, oral, Daily  gabapentin, 200 mg, oral, BID  insulin lispro, 0-10 Units, subcutaneous, q4h  [Held by provider] metoprolol succinate XL, 50 mg, oral, Daily  piperacillin-tazobactam, 3.375 g, intravenous, q6h  pravastatin, 20 mg, oral, Nightly  sennosides-docusate sodium, 2 tablet, oral, Nightly  [Held by provider] valsartan 160 mg, hydroCHLOROthiazide 12.5 mg for Diovan HCT, , oral, Daily      Continuous medications  sodium chloride 0.9%, 100 mL/hr, Last Rate: 100 mL/hr (01/11/25 0917)      PRN medications  PRN medications: acetaminophen **OR** acetaminophen, dextrose, dextrose, glucagon, glucagon, HYDROmorphone, HYDROmorphone  Results for orders placed or performed during the hospital encounter of 01/11/25 (from the past 24 hours)   CBC and Auto Differential   Result Value Ref Range    WBC 6.8 4.4 - 11.3 x10*3/uL    nRBC 0.0 0.0 - 0.0 /100 WBCs    RBC 3.23 (L) 4.00 - 5.20 x10*6/uL    Hemoglobin 9.7 (L) 12.0 - 16.0 g/dL    Hematocrit 30.6 (L) 36.0 - 46.0 %    MCV 95 80 - 100 fL    MCH 30.0 26.0 - 34.0 pg    MCHC 31.7 (L) 32.0 - 36.0 g/dL    RDW 13.2 11.5 - 14.5 %    Platelets 222 150 - 450 x10*3/uL    Neutrophils % 54.9 40.0 - 80.0 %    Immature Granulocytes %, Automated 0.1 0.0 - 0.9 %    Lymphocytes % 29.9  13.0 - 44.0 %    Monocytes % 11.6 2.0 - 10.0 %    Eosinophils % 2.9 0.0 - 6.0 %    Basophils % 0.6 0.0 - 2.0 %    Neutrophils Absolute 3.72 1.60 - 5.50 x10*3/uL    Immature Granulocytes Absolute, Automated 0.01 0.00 - 0.50 x10*3/uL    Lymphocytes Absolute 2.03 0.80 - 3.00 x10*3/uL    Monocytes Absolute 0.79 0.05 - 0.80 x10*3/uL    Eosinophils Absolute 0.20 0.00 - 0.40 x10*3/uL    Basophils Absolute 0.04 0.00 - 0.10 x10*3/uL   Comprehensive Metabolic Panel   Result Value Ref Range    Glucose 198 (H) 74 - 99 mg/dL    Sodium 139 136 - 145 mmol/L    Potassium 3.8 3.5 - 5.3 mmol/L    Chloride 98 98 - 107 mmol/L    Bicarbonate 31 21 - 32 mmol/L    Anion Gap 14 10 - 20 mmol/L    Urea Nitrogen 36 (H) 6 - 23 mg/dL    Creatinine 1.35 (H) 0.50 - 1.05 mg/dL    eGFR 38 (L) >60 mL/min/1.73m*2    Calcium 9.7 8.6 - 10.3 mg/dL    Albumin 3.3 (L) 3.4 - 5.0 g/dL    Alkaline Phosphatase 52 33 - 136 U/L    Total Protein 6.4 6.4 - 8.2 g/dL    AST 24 9 - 39 U/L    Bilirubin, Total 0.4 0.0 - 1.2 mg/dL    ALT 17 7 - 45 U/L   Ammonia   Result Value Ref Range    Ammonia 16 16 - 53 umol/L   TSH with reflex to Free T4 if abnormal   Result Value Ref Range    Thyroid Stimulating Hormone 1.78 0.44 - 3.98 mIU/L   B-Type Natriuretic Peptide   Result Value Ref Range    BNP 51 0 - 99 pg/mL   Creatine Kinase   Result Value Ref Range    Creatine Kinase 192 0 - 215 U/L   Acute Toxicology Panel, Blood   Result Value Ref Range    Acetaminophen <10.0 10.0 - 30.0 ug/mL    Salicylate  <3 4 - 20 mg/dL    Alcohol <10 <=10 mg/dL   Troponin I, High Sensitivity, Initial   Result Value Ref Range    Troponin I, High Sensitivity 12 0 - 13 ng/L   Sedimentation rate, automated   Result Value Ref Range    Sedimentation Rate 66 (H) 0 - 30 mm/h   C-reactive protein   Result Value Ref Range    C-Reactive Protein 8.29 (H) <1.00 mg/dL   Reticulocytes   Result Value Ref Range    Retic % 1.7 0.5 - 2.0 %    Retic Absolute 0.054 0.017 - 0.110 x10*6/uL    Reticulocyte Hemoglobin  27 (L) 28 - 38 pg    Immature Retic fraction 8.7 <=16.0 %   DRUG SCREEN,URINE   Result Value Ref Range    Amphetamine Screen, Urine Presumptive Negative Presumptive Negative    Barbiturate Screen, Urine Presumptive Negative Presumptive Negative    Benzodiazepines Screen, Urine Presumptive Negative Presumptive Negative    Cannabinoid Screen, Urine Presumptive Negative Presumptive Negative    Cocaine Metabolite Screen, Urine Presumptive Negative Presumptive Negative    Fentanyl Screen, Urine Presumptive Negative Presumptive Negative    Opiate Screen, Urine Presumptive Negative Presumptive Negative    Oxycodone Screen, Urine Presumptive Negative Presumptive Negative    PCP Screen, Urine Presumptive Negative Presumptive Negative    Methadone Screen, Urine Presumptive Negative Presumptive Negative   Urinalysis with Reflex Microscopic   Result Value Ref Range    Color, Urine Colorless (N) Light-Yellow, Yellow, Dark-Yellow    Appearance, Urine Clear Clear    Specific Gravity, Urine 1.011 1.005 - 1.035    pH, Urine 5.5 5.0, 5.5, 6.0, 6.5, 7.0, 7.5, 8.0    Protein, Urine NEGATIVE NEGATIVE, 10 (TRACE), 20 (TRACE) mg/dL    Glucose, Urine Normal Normal mg/dL    Blood, Urine NEGATIVE NEGATIVE    Ketones, Urine NEGATIVE NEGATIVE mg/dL    Bilirubin, Urine NEGATIVE NEGATIVE    Urobilinogen, Urine Normal Normal mg/dL    Nitrite, Urine NEGATIVE NEGATIVE    Leukocyte Esterase, Urine NEGATIVE NEGATIVE   Lactate   Result Value Ref Range    Lactate 1.5 0.4 - 2.0 mmol/L   Blood Culture    Specimen: Peripheral Venipuncture; Blood culture   Result Value Ref Range    Blood Culture Loaded on Instrument - Culture in progress    Blood Culture    Specimen: Peripheral Venipuncture; Blood culture   Result Value Ref Range    Blood Culture Loaded on Instrument - Culture in progress    Troponin, High Sensitivity, 1 Hour   Result Value Ref Range    Troponin I, High Sensitivity 9 0 - 13 ng/L   BLOOD GAS VENOUS   Result Value Ref Range    POCT pH,  Venous 7.44 (H) 7.33 - 7.43 pH    POCT pCO2, Venous 47 41 - 51 mm Hg    POCT pO2, Venous 35 35 - 45 mm Hg    POCT SO2, Venous 59 45 - 75 %    POCT Oxy Hemoglobin, Venous 57.8 45.0 - 75.0 %    POCT Base Excess, Venous 6.6 (H) -2.0 - 3.0 mmol/L    POCT HCO3 Calculated, Venous 31.9 (H) 22.0 - 26.0 mmol/L    Patient Temperature      FiO2 21 %   Iron and TIBC   Result Value Ref Range    Iron 31 (L) 35 - 150 ug/dL    UIBC 184 110 - 370 ug/dL    TIBC 215 (L) 240 - 445 ug/dL    % Saturation 14 (L) 25 - 45 %   Ferritin   Result Value Ref Range    Ferritin 48 8 - 150 ng/mL   POCT GLUCOSE   Result Value Ref Range    POCT Glucose 186 (H) 74 - 99 mg/dL   CBC   Result Value Ref Range    WBC 5.9 4.4 - 11.3 x10*3/uL    nRBC 0.0 0.0 - 0.0 /100 WBCs    RBC 2.97 (L) 4.00 - 5.20 x10*6/uL    Hemoglobin 8.8 (L) 12.0 - 16.0 g/dL    Hematocrit 26.6 (L) 36.0 - 46.0 %    MCV 90 80 - 100 fL    MCH 29.6 26.0 - 34.0 pg    MCHC 33.1 32.0 - 36.0 g/dL    RDW 13.0 11.5 - 14.5 %    Platelets 227 150 - 450 x10*3/uL   Comprehensive metabolic panel   Result Value Ref Range    Glucose 137 (H) 74 - 99 mg/dL    Sodium 137 136 - 145 mmol/L    Potassium 3.6 3.5 - 5.3 mmol/L    Chloride 99 98 - 107 mmol/L    Bicarbonate 30 21 - 32 mmol/L    Anion Gap 12 10 - 20 mmol/L    Urea Nitrogen 31 (H) 6 - 23 mg/dL    Creatinine 1.24 (H) 0.50 - 1.05 mg/dL    eGFR 42 (L) >60 mL/min/1.73m*2    Calcium 9.2 8.6 - 10.3 mg/dL    Albumin 3.0 (L) 3.4 - 5.0 g/dL    Alkaline Phosphatase 44 33 - 136 U/L    Total Protein 5.8 (L) 6.4 - 8.2 g/dL    AST 20 9 - 39 U/L    Bilirubin, Total 0.5 0.0 - 1.2 mg/dL    ALT 14 7 - 45 U/L   Magnesium   Result Value Ref Range    Magnesium 1.65 1.60 - 2.40 mg/dL   Phosphorus   Result Value Ref Range    Phosphorus 3.9 2.5 - 4.9 mg/dL   Lipase   Result Value Ref Range    Lipase 87 (H) 9 - 82 U/L   Amylase   Result Value Ref Range    Amylase 36 29 - 103 U/L   C-Reactive Protein   Result Value Ref Range    C-Reactive Protein 6.96 (H) <1.00 mg/dL         Assessment/Plan     Left iliopsoas hematoma-will assess patient's outpatient chart to more fully determine what tests have been completed.  As of right now with the isolated ileus psoas hematoma I would not recommend any surgical intervention.  IR may be consulted for aspiration of the hematoma to rule out any other possible etiology of the fluid collection.  In review of the patient's previous MRI of the right lower extremity she did have an iliopsoas rupture of that extremity, chronicity of which unknown.  Will reassess the patient in the morning.    Jagjit Torres, DO

## 2025-01-12 VITALS
HEART RATE: 98 BPM | BODY MASS INDEX: 35.7 KG/M2 | OXYGEN SATURATION: 97 % | HEIGHT: 62 IN | WEIGHT: 194 LBS | TEMPERATURE: 97.3 F | SYSTOLIC BLOOD PRESSURE: 125 MMHG | RESPIRATION RATE: 16 BRPM | DIASTOLIC BLOOD PRESSURE: 59 MMHG

## 2025-01-12 LAB
ALBUMIN SERPL BCP-MCNC: 3 G/DL (ref 3.4–5)
ALP SERPL-CCNC: 47 U/L (ref 33–136)
ALT SERPL W P-5'-P-CCNC: 14 U/L (ref 7–45)
ANION GAP SERPL CALC-SCNC: 13 MMOL/L (ref 10–20)
AST SERPL W P-5'-P-CCNC: 18 U/L (ref 9–39)
BACTERIA BLD CULT: NORMAL
BACTERIA BLD CULT: NORMAL
BACTERIA UR CULT: NORMAL
BILIRUB SERPL-MCNC: 0.6 MG/DL (ref 0–1.2)
BUN SERPL-MCNC: 25 MG/DL (ref 6–23)
CALCIUM SERPL-MCNC: 9.2 MG/DL (ref 8.6–10.3)
CHLORIDE SERPL-SCNC: 103 MMOL/L (ref 98–107)
CO2 SERPL-SCNC: 28 MMOL/L (ref 21–32)
CREAT SERPL-MCNC: 1.31 MG/DL (ref 0.5–1.05)
EGFRCR SERPLBLD CKD-EPI 2021: 40 ML/MIN/1.73M*2
ERYTHROCYTE [DISTWIDTH] IN BLOOD BY AUTOMATED COUNT: 13.2 % (ref 11.5–14.5)
FOLATE SERPL-MCNC: 11 NG/ML
GLUCOSE BLD MANUAL STRIP-MCNC: 115 MG/DL (ref 74–99)
GLUCOSE BLD MANUAL STRIP-MCNC: 126 MG/DL (ref 74–99)
GLUCOSE BLD MANUAL STRIP-MCNC: 134 MG/DL (ref 74–99)
GLUCOSE BLD MANUAL STRIP-MCNC: 136 MG/DL (ref 74–99)
GLUCOSE BLD MANUAL STRIP-MCNC: 170 MG/DL (ref 74–99)
GLUCOSE BLD MANUAL STRIP-MCNC: 177 MG/DL (ref 74–99)
GLUCOSE SERPL-MCNC: 127 MG/DL (ref 74–99)
HCT VFR BLD AUTO: 30.7 % (ref 36–46)
HGB BLD-MCNC: 9.7 G/DL (ref 12–16)
MAGNESIUM SERPL-MCNC: 1.68 MG/DL (ref 1.6–2.4)
MCH RBC QN AUTO: 29.8 PG (ref 26–34)
MCHC RBC AUTO-ENTMCNC: 31.6 G/DL (ref 32–36)
MCV RBC AUTO: 94 FL (ref 80–100)
NRBC BLD-RTO: 0 /100 WBCS (ref 0–0)
PHOSPHATE SERPL-MCNC: 3.6 MG/DL (ref 2.5–4.9)
PLATELET # BLD AUTO: 242 X10*3/UL (ref 150–450)
POTASSIUM SERPL-SCNC: 4.2 MMOL/L (ref 3.5–5.3)
PROT SERPL-MCNC: 6 G/DL (ref 6.4–8.2)
RBC # BLD AUTO: 3.26 X10*6/UL (ref 4–5.2)
SODIUM SERPL-SCNC: 140 MMOL/L (ref 136–145)
VIT B12 SERPL-MCNC: 499 PG/ML (ref 211–911)
WBC # BLD AUTO: 6.8 X10*3/UL (ref 4.4–11.3)

## 2025-01-12 PROCEDURE — 2500000004 HC RX 250 GENERAL PHARMACY W/ HCPCS (ALT 636 FOR OP/ED)

## 2025-01-12 PROCEDURE — 84100 ASSAY OF PHOSPHORUS: CPT

## 2025-01-12 PROCEDURE — 85027 COMPLETE CBC AUTOMATED: CPT

## 2025-01-12 PROCEDURE — 36415 COLL VENOUS BLD VENIPUNCTURE: CPT | Performed by: BEHAVIOR TECHNICIAN

## 2025-01-12 PROCEDURE — 2500000001 HC RX 250 WO HCPCS SELF ADMINISTERED DRUGS (ALT 637 FOR MEDICARE OP)

## 2025-01-12 PROCEDURE — 99232 SBSQ HOSP IP/OBS MODERATE 35: CPT

## 2025-01-12 PROCEDURE — 82746 ASSAY OF FOLIC ACID SERUM: CPT | Mod: GEALAB | Performed by: BEHAVIOR TECHNICIAN

## 2025-01-12 PROCEDURE — 82947 ASSAY GLUCOSE BLOOD QUANT: CPT

## 2025-01-12 PROCEDURE — 1100000001 HC PRIVATE ROOM DAILY

## 2025-01-12 PROCEDURE — 82607 VITAMIN B-12: CPT | Mod: GEALAB | Performed by: BEHAVIOR TECHNICIAN

## 2025-01-12 PROCEDURE — 80053 COMPREHEN METABOLIC PANEL: CPT

## 2025-01-12 PROCEDURE — 2500000002 HC RX 250 W HCPCS SELF ADMINISTERED DRUGS (ALT 637 FOR MEDICARE OP, ALT 636 FOR OP/ED)

## 2025-01-12 PROCEDURE — 83735 ASSAY OF MAGNESIUM: CPT

## 2025-01-12 RX ADMIN — INSULIN LISPRO 2 UNITS: 100 INJECTION, SOLUTION INTRAVENOUS; SUBCUTANEOUS at 00:12

## 2025-01-12 RX ADMIN — GABAPENTIN 200 MG: 100 CAPSULE ORAL at 09:56

## 2025-01-12 RX ADMIN — SENNOSIDES AND DOCUSATE SODIUM 2 TABLET: 50; 8.6 TABLET ORAL at 20:52

## 2025-01-12 RX ADMIN — FAMOTIDINE 20 MG: 20 TABLET, FILM COATED ORAL at 09:56

## 2025-01-12 RX ADMIN — PIPERACILLIN SODIUM AND TAZOBACTAM SODIUM 3.38 G: 3; .375 INJECTION, SOLUTION INTRAVENOUS at 19:36

## 2025-01-12 RX ADMIN — PIPERACILLIN SODIUM AND TAZOBACTAM SODIUM 3.38 G: 3; .375 INJECTION, SOLUTION INTRAVENOUS at 12:48

## 2025-01-12 RX ADMIN — PRAVASTATIN SODIUM 20 MG: 40 TABLET ORAL at 20:53

## 2025-01-12 RX ADMIN — GABAPENTIN 200 MG: 100 CAPSULE ORAL at 20:52

## 2025-01-12 RX ADMIN — PIPERACILLIN SODIUM AND TAZOBACTAM SODIUM 3.38 G: 3; .375 INJECTION, SOLUTION INTRAVENOUS at 00:12

## 2025-01-12 RX ADMIN — PIPERACILLIN SODIUM AND TAZOBACTAM SODIUM 3.38 G: 3; .375 INJECTION, SOLUTION INTRAVENOUS at 06:00

## 2025-01-12 RX ADMIN — SODIUM CHLORIDE 100 ML/HR: 9 INJECTION, SOLUTION INTRAVENOUS at 06:01

## 2025-01-12 RX ADMIN — Medication 1 TABLET: at 09:56

## 2025-01-12 ASSESSMENT — COGNITIVE AND FUNCTIONAL STATUS - GENERAL
DAILY ACTIVITIY SCORE: 14
TURNING FROM BACK TO SIDE WHILE IN FLAT BAD: A LITTLE
WALKING IN HOSPITAL ROOM: A LOT
PERSONAL GROOMING: A LITTLE
STANDING UP FROM CHAIR USING ARMS: A LOT
DRESSING REGULAR UPPER BODY CLOTHING: A LOT
DAILY ACTIVITIY SCORE: 14
WALKING IN HOSPITAL ROOM: A LOT
STANDING UP FROM CHAIR USING ARMS: A LOT
EATING MEALS: A LITTLE
MOVING TO AND FROM BED TO CHAIR: A LOT
HELP NEEDED FOR BATHING: A LOT
DRESSING REGULAR UPPER BODY CLOTHING: A LOT
MOVING FROM LYING ON BACK TO SITTING ON SIDE OF FLAT BED WITH BEDRAILS: A LITTLE
TOILETING: A LOT
DRESSING REGULAR LOWER BODY CLOTHING: A LOT
DRESSING REGULAR LOWER BODY CLOTHING: A LOT
MOVING FROM LYING ON BACK TO SITTING ON SIDE OF FLAT BED WITH BEDRAILS: A LITTLE
CLIMB 3 TO 5 STEPS WITH RAILING: A LOT
MOBILITY SCORE: 14
MOBILITY SCORE: 14
TURNING FROM BACK TO SIDE WHILE IN FLAT BAD: A LITTLE
CLIMB 3 TO 5 STEPS WITH RAILING: A LOT
MOVING TO AND FROM BED TO CHAIR: A LOT
PERSONAL GROOMING: A LITTLE
EATING MEALS: A LITTLE
HELP NEEDED FOR BATHING: A LOT
TOILETING: A LOT

## 2025-01-12 ASSESSMENT — PAIN SCALES - GENERAL
PAINLEVEL_OUTOF10: 0 - NO PAIN
PAINLEVEL_OUTOF10: 0 - NO PAIN

## 2025-01-12 NOTE — PROGRESS NOTES
Donna J Gonda is a 84 y.o. female on day 1 of admission presenting with Altered mental status, unspecified altered mental status type.    Subjective   Interval History: no fever, no new complaints        Review of Systems    Objective   Range of Vitals (last 24 hours)  Heart Rate:  [84-98]   Temp:  [35.9 °C (96.6 °F)-36.3 °C (97.3 °F)]   Resp:  [16-18]   BP: (124-146)/(64-77)   SpO2:  [98 %-100 %]   Daily Weight  01/11/25 : 88 kg (194 lb 0.1 oz)    Body mass index is 35.47 kg/m².    Physical Exam  Constitutional:       Appearance: Normal appearance.   HENT:      Head: Normocephalic and atraumatic.      Mouth/Throat:      Mouth: Mucous membranes are moist.      Pharynx: Oropharynx is clear.   Eyes:      Pupils: Pupils are equal, round, and reactive to light.   Cardiovascular:      Rate and Rhythm: Normal rate and regular rhythm.      Heart sounds: Normal heart sounds.   Pulmonary:      Effort: Pulmonary effort is normal.      Breath sounds: Normal breath sounds.   Abdominal:      General: Abdomen is flat. Bowel sounds are normal.      Palpations: Abdomen is soft.   Musculoskeletal:         General: Swelling present.      Cervical back: Normal range of motion.      Comments: stasis   Neurological:      Mental Status: She is alert.         Antibiotics  piperacillin-tazobactam - 3.375 gram/50 mL    Relevant Results  Labs  Results from last 72 hours   Lab Units 01/12/25  0739 01/11/25  1620 01/11/25  0711 01/11/25  0028   WBC AUTO x10*3/uL 6.8  --  5.9 6.8   HEMOGLOBIN g/dL 9.7* 10.3* 8.8* 9.7*   HEMATOCRIT % 30.7* 32.3* 26.6* 30.6*   PLATELETS AUTO x10*3/uL 242  --  227 222   NEUTROS PCT AUTO %  --   --   --  54.9   LYMPHS PCT AUTO %  --   --   --  29.9   MONOS PCT AUTO %  --   --   --  11.6   EOS PCT AUTO %  --   --   --  2.9     Results from last 72 hours   Lab Units 01/12/25  0739 01/11/25  0711 01/11/25  0028 01/10/25  0615   SODIUM mmol/L 140 137 139 141   POTASSIUM mmol/L 4.2 3.6 3.8 4.3   CHLORIDE mmol/L 103 99  98 102   CO2 mmol/L 28 30 31 30   BUN mg/dL 25* 31* 36* 29*   CREATININE mg/dL 1.31* 1.24* 1.35* 1.08*   GLUCOSE mg/dL 127* 137* 198* 102*   CALCIUM mg/dL 9.2 9.2 9.7 9.9   ANION GAP mmol/L 13 12 14 13   EGFR mL/min/1.73m*2 40* 42* 38* 50*   PHOSPHORUS mg/dL 3.6 3.9  --  3.8     Results from last 72 hours   Lab Units 01/12/25  0739 01/11/25  0711 01/11/25  0028   ALK PHOS U/L 47 44 52   BILIRUBIN TOTAL mg/dL 0.6 0.5 0.4   PROTEIN TOTAL g/dL 6.0* 5.8* 6.4   ALT U/L 14 14 17   AST U/L 18 20 24   ALBUMIN g/dL 3.0* 3.0* 3.3*     Estimated Creatinine Clearance: 31.8 mL/min (A) (by C-G formula based on SCr of 1.31 mg/dL (H)).  C-Reactive Protein   Date Value Ref Range Status   01/11/2025 6.96 (H) <1.00 mg/dL Final   01/11/2025 8.29 (H) <1.00 mg/dL Final     Microbiology  Reviewed  Imaging  Reviewed        Assessment/Plan   Encephalopathy, likely metabolic, resolved  Dilatated biliary tree, LFT are N, MRI with dilated biliary / pancreatic ducts, no obvious lesions   Ruptured Lt iliopsoas tendon      Recommendations :  Continue Zosyn     I spent minutes in the professional and overall care of this patient.      Radha Dash MD

## 2025-01-12 NOTE — PROGRESS NOTES
Donna J Gonda is a 84 y.o. female on day 1 of admission presenting with Altered mental status, unspecified altered mental status type.      Subjective   No acute overnight events. No new complaints    Objective     Last Recorded Vitals  /77 (BP Location: Right arm, Patient Position: Lying)   Pulse 85   Temp 36.2 °C (97.2 °F) (Temporal)   Resp 16   Wt 88 kg (194 lb 0.1 oz)   SpO2 100%   Intake/Output last 3 Shifts:    Intake/Output Summary (Last 24 hours) at 1/12/2025 1402  Last data filed at 1/12/2025 0915  Gross per 24 hour   Intake 2780 ml   Output 1300 ml   Net 1480 ml       Admission Weight  Weight: 87 kg (191 lb 12.8 oz) (01/11/25 0023)    Daily Weight  01/11/25 : 88 kg (194 lb 0.1 oz)    Image Results  MRCP pancreas w and wo IV contrast  Narrative: Interpreted By:  Adam Del Rosario,   STUDY:  MRCP PANCREAS W AND WO IV CONTRAST;  1/11/2025 1:10 pm      INDICATION:  Signs/Symptoms:ruq tenderness concern for choledocho / cholangitis.          COMPARISON:  Right upper quadrant ultrasound and CT abdomen/pelvis of same date.      ACCESSION NUMBER(S):  [id number]      ORDERING CLINICIAN:  DERRICK FREEDMAN      TECHNIQUE:  Multisequence multiplanar imaging of the abdomen was performed with  attention to the pancreas. MRCP sequences with MIPS reformations were  included. Sequences were obtained both without as well as following  intravenous administration of 17 mL Dotarem contrast.      FINDINGS:  LIVER:  Liver is not significantly enlarged there is some relative atrophy of  the left hepatic lobe. Hepatic parenchyma is isointense on opposed  phase sequences. No focal hepatic lesion is seen.      BILE DUCTS:  There is diffuse dilation of the common bile duct measuring up to 1  cm in diameter as well as mild-moderate diffuse intrahepatic biliary  dilation. No filling defect is seen within the common bile duct. No  discrete obstructing biliary lesion is identified.      GALLBLADDER:  Surgically absent.       PANCREAS:  There is diffuse dilation of the main pancreatic duct measuring up to  7 mm in diameter in the ventral segment of the pancreatic head and 4  mm in diameter in the pancreatic body and tail. Multiple mildly  dilated ductal side branches and subcentimeter cystic foci are seen  within pancreatic head as well as the pancreatic distal body and  tail. There is conventional pancreatic ductal anatomy in the dilated  main pancreatic duct extends to the major papilla level. No enhancing  pancreatic lesion is seen.      SPLEEN:  Spleen is not enlarged. No discrete splenic lesion.      ADRENAL GLANDS:  Unremarkable.      KIDNEYS:  There is a left renal mid aspect posterior T1 intermediate signal  intensity, T2 mixed signal intensity mass measuring 2 x 2 x 2 cm.  Following contrast administration there question of mild intrinsic  enhancement of this lesion concerning for a small renal neoplasm.      A right renal upper pole round 1.1 cm T1 hyperintense, T2  intermediate signal intensity cortical lesion does not show  enhancement compatible with a small complicated cyst containing  debris.      A right renal mid lateral aspect round partially exophytic T1  hypointense, T2 hyperintense nonenhancing 4.7 cm cyst is present.  There is also a right renal lower pole anterior partially exophytic  1.7 cm T2 hyperintense cyst. Multiple additional subcentimeter T2  hyperintense renal cortical cysts are present bilaterally. No  hydronephrosis bilaterally.      LYMPH NODES:  No lymphadenopathy is seen.      ABDOMINAL VESSELS:  No aortic aneurysm. IVC is unremarkable.      BOWEL:  Included portions of large and small bowel are not abnormally dilated.      PERITONEUM/RETROPERITONEUM:  No free fluid is seen.      BONES AND LOWER THORAX:  There is partial visualization of thoracolumbar mild S-shaped  scoliosis and multilevel degenerative changes of the spine.      Impression: 1.  Mild-moderate diffuse intrahepatic biliary dilation as  well as  diffuse dilation of the common bile duct measuring 1 cm in diameter.  No biliary intraductal filling defect.  2. Diffuse dilation of the main pancreatic duct measuring up to 7 mm  in diameter at the pancreatic head level. Multiple mildly dilated  pancreatic ductal side branches and subcentimeter cystic foci  throughout the pancreas. No enhancing pancreatic lesion identified.  3. Both the dilated main pancreatic duct and dilated common bile duct  extend to the ampulla level. Ampullary stenosis or occult obstructing  ampullary lesion remain considerations.  4. Left renal mid posterior aspect indeterminate 2 cm T2 mixed signal  intensity mass with possible mild intrinsic enhancement concerning  for a small renal neoplasm.  5. Multiple additional varying sized renal cysts bilaterally as well  as a right renal upper pole 1.1 cm nonenhancing complicated cyst  containing debris.          MACRO:  None      Signed by: Adam Del Rosario 1/11/2025 3:00 PM  Dictation workstation:   [id number]  US right upper quadrant  Narrative: Interpreted By:  Adam Del Rosario,   STUDY:  US RIGHT UPPER QUADRANT;  1/11/2025 9:01 am      INDICATION:  Signs/Symptoms:RUQ pain eval for choledocho. hx cholecystectomy.          COMPARISON:  CT abdomen/pelvis of same date.      ACCESSION NUMBER(S):  [id number]      ORDERING CLINICIAN:  DERRICK FREEDMAN      TECHNIQUE:  Real-time sonographic evaluation of the right upper quadrant was  performed.      FINDINGS:  LIVER:  The liver is homogeneous in echotexture. No focal hepatic lesion is  identified.  Liver measures 14.6 cm in sagittal dimension.      GALLBLADDER:  Surgically absent.      BILIARY TREE:  Common bile duct is dilated measuring 1 cm in diameter. Distal common  bile duct is obscured by bowel gas. Intrahepatic biliary ductal  dilation also present.      PANCREAS:  Pancreatic visualization is limited due to obscuration from bowel  gas. There is dilation of the main pancreatic duct  measuring 5 mm in  diameter at the pancreatic head level similar to CT.      RIGHT KIDNEY:  Right kidney measures  10cm in length.  No right hydronephrosis is  seen. Right renal mid aspect round hypoechoic partially exophytic  cyst measures 4.6 x 4.3 x 4.8 cm.      Impression: Intrahepatic and extrahepatic biliary dilation. Dilated common bile  duct measuring 1 cm in diameter as well as partially visualized  dilated main pancreatic duct measuring 5 mm in diameter. Distal  common bile duct obscured by bowel gas. Ampullary level obstructing  process can not be excluded. MRCP could be considered for further  evaluation.      No focal hepatic abnormality demonstrated.      MACRO:  None.      Signed by: Adam Del Rosario 1/11/2025 10:16 AM  Dictation workstation:   [id number]  CT abdomen pelvis w IV contrast  Narrative: Interpreted By:  Pravin David,   STUDY:  CT ABDOMEN PELVIS W IV CONTRAST;  1/11/2025 2:37 am      INDICATION:  Signs/Symptoms:RUQ abd pain.          COMPARISON:  CT abdomen and pelvis dated 12/17/2013; MRI of the lumbar spine dated  09/12/2023.      ACCESSION NUMBER(S):  [id number]      ORDERING CLINICIAN:  RENALDO LOERA      TECHNIQUE:  Contiguous axial images of the abdomen and pelvis were obtained after  the intravenous administration of 75 mL of Omnipaque 350 iodinated  contrast. Coronal and sagittal reformatted images were reconstructed  from the axial data.      FINDINGS:  LOWER CHEST: Atelectasis/scarring are present in the lower lobes  bilaterally, without evidence of consolidation or pleural effusion.  There is persistent elevation of the right hemidiaphragm suggestive  of a Morgagni hernia.      Heart is normal in size without pericardial effusion.      Distal esophagus is unremarkable in appearance.          ABDOMEN/PELVIS:      Images are somewhat degraded by motion.      ABDOMINAL WALL: There is a ventral abdominal wall hernia along the  midline. No acute abnormalities identified in  the cutaneous tissues  of the      LIVER: No acute hepatic parenchymal abnormality is present.      BILE DUCTS: There is dilatation of the central intrahepatic biliary  tree and common bile duct, somewhat increased from prior study in  2013.      GALLBLADDER: Gallbladder is surgically absent.      PANCREAS: No peripancreatic stranding is present. The proximal  pancreatic duct is dilated, measuring up to 6 mm in diameter,  previously measuring 4 mm in December of 2013.      SPLEEN: No acute splenic abnormality is present.      ADRENALS: Bilateral adrenal glands are unremarkable in appearance.      KIDNEYS, URETERS, BLADDER: Kidneys are symmetric in size and  enhancement without hydronephrosis or radiopaque nephrolithiasis.  Several exophytic cysts are present bilaterally, largest measuring up  to 4.7 cm in size.      Ureters are not dilated. No bladder wall thickening is present.      REPRODUCTIVE ORGANS: Uterus is surgically absent. No adnexal masses  are identified.      VESSELS: Mild vascular calcifications are present in the abdominal  aorta. No acute vascular abnormality is identified. IVC is flattened.      RETROPERITONEUM/LYMPH NODES: The left psoas and iliacus muscles  (series 201, image 78 and 113) demonstrate asymmetric enlargement  compared to the contralateral right side, with somewhat indistinct  appearance of the left iliacus, new since prior MRI of the lumbar  spine on 09/12/2023. No enlarged lymph nodes.      BOWEL/MESENTERY/PERITONEUM: Scattered diverticula are present in the  descending and sigmoid colon without evidence of acute  diverticulitis. No inflammatory bowel wall thickening or dilatation.  Appendix is not definitely identified, no secondary signs of acute  appendicitis present in the right lower quadrant.      No ascites, free air, or fluid collection.          MUSCULOSKELETAL: No acute osseous abnormality. No suspicious osseous  lesion. Multilevel degenerative changes of the lower  thoracic and  lumbar spine without compression fracture or high-grade stenosis.      Impression: 1. New central intrahepatic biliary dilatation with mild increased  distention of the common bile duct compared to prior CT in 2013, now  measuring up to 11 mm in diameter, without evidence of  choledocholithiasis. Additionally, there is slight increase in the  dilatation of the pancreatic duct compared to 2013, now measuring up  to 6 mm compared to 4 mm previously. Finding is of unclear etiology,  although differential includes a mass at the sphincter of Oddi,  radiolucent choledocholithiasis in the distal CBD, and ascending  cholangitis.  2. Asymmetric enlargement of the left psoas and iliacus muscles  compared to the contralateral right side, new since prior MRI of the  lumbar spine on 09/12/2023, with somewhat indistinct appearance of  the iliacus near the left hip. Finding is suspicious for underlying  hematoma of indeterminate chronicity, possibly subacute/chronic,  although other infiltrative processes are not entirely excluded.  Correlate with any history of trauma.  3. Flattened IVC. Correlate with fluid volume status and hypovolemia.  4. Numerous diverticula are present in the descending and sigmoid  colon without evidence of acute diverticulitis.          MACRO:  None.      Signed by: Pravin David 1/11/2025 3:06 AM  Dictation workstation:   [id number]  XR chest 1 view  Narrative: Interpreted By:  Jacob Reyes,   STUDY:  XR CHEST 1 VIEW;  1/11/2025 1:05 am      INDICATION:  Signs/Symptoms:AMS.      COMPARISON:  Chest x-ray 03/29/2011      ACCESSION NUMBER(S):  [id number]      ORDERING CLINICIAN:  RENALDO LOERA      FINDINGS:  Right-sided MediPort terminates in the SVC. Overlying leads noted.      CARDIOMEDIASTINAL SILHOUETTE:  Cardiomediastinal silhouette is stable in size and configuration.      LUNGS:  No consolidation, pleural effusion or pneumothorax. Left basilar  atelectasis and or  scarring.      ABDOMEN:  No remarkable upper abdominal findings.      BONES:  Multilevel degenerative changes of the spine. Bilateral shoulder  osteoarthrosis.      Impression: No acute cardiopulmonary process.      MACRO:  None      Signed by: Jacob Reyes 1/11/2025 1:10 AM  Dictation workstation:   [id number]  CT head wo IV contrast  Narrative: Interpreted By:  Jacob Reyes,   STUDY:  CT HEAD WO IV CONTRAST;  1/11/2025 1:00 am      INDICATION:  Signs/Symptoms:ams.      COMPARISON:  None.      ACCESSION NUMBER(S):  [id number]      ORDERING CLINICIAN:  RENALDO LOERA      TECHNIQUE:  Axial noncontrast CT images of the head.      FINDINGS:  BRAIN PARENCHYMA: Gray-white matter interfaces are preserved. No  mass, mass effect or midline shift. Mild deep and periventricular  white matter hypodensities are nonspecific, but favored to represent  chronic small vessel ischemic changes.      HEMORRHAGE: No acute intracranial hemorrhage.  VENTRICLES and EXTRA-AXIAL SPACES: Mild-to-moderate volume loss with  prominence of the ventricles and sulci. EXTRACRANIAL SOFT TISSUES:  Within normal limits. PARANASAL SINUSES/MASTOIDS: The visualized  paranasal sinuses and mastoid air cells are aerated. CALVARIUM: No  depressed skull fracture. No destructive osseous lesion.      OTHER FINDINGS: Atherosclerotic calcification of the carotid siphons  and vertebral arteries.      Impression: No acute intracranial abnormality. If symptoms persist or there is  high clinical suspicion further evaluation with MRI can be considered.      Chronic changes as noted above.      MACRO:  None      Signed by: Jacob Reyes 1/11/2025 1:07 AM  Dictation workstation:   [id number]      Physical Exam  Constitutional:       Appearance: She is obese. She is not ill-appearing.   Cardiovascular:      Rate and Rhythm: Normal rate and regular rhythm.      Pulses: Normal pulses.      Heart sounds: Normal heart sounds.   Pulmonary:      Effort: Pulmonary effort  is normal.      Breath sounds: Normal breath sounds.   Abdominal:      Palpations: Abdomen is soft.      Tenderness: There is abdominal tenderness. There is guarding. There is no rebound.      Comments: Mild TTP to RUQ with involuntary guarding   Musculoskeletal:         General: Tenderness present.      Right lower leg: No edema.      Left lower leg: No edema.      Comments: TTP to mediolateral aspect of L thigh   Skin:     General: Skin is warm and dry.      Findings: Rash present. No bruising.      Comments: Non pruritic, nontender rash about 4 cm in length.   Neurological:      General: No focal deficit present.      Mental Status: She is alert and oriented to person, place, and time.   Psychiatric:         Mood and Affect: Mood normal.         Behavior: Behavior normal.     Relevant Results  Results from last 7 days   Lab Units 01/12/25  0739   WBC AUTO x10*3/uL 6.8   HEMOGLOBIN g/dL 9.7*   HEMATOCRIT % 30.7*   PLATELETS AUTO x10*3/uL 242     Results from last 7 days   Lab Units 01/12/25  0739   SODIUM mmol/L 140   POTASSIUM mmol/L 4.2   CHLORIDE mmol/L 103   CO2 mmol/L 28   BUN mg/dL 25*   CREATININE mg/dL 1.31*   EGFR mL/min/1.73m*2 40*   GLUCOSE mg/dL 127*   CALCIUM mg/dL 9.2   PHOSPHORUS mg/dL 3.6     Results from last 7 days   Lab Units 01/12/25  0739   ALK PHOS U/L 47   BILIRUBIN TOTAL mg/dL 0.6   PROTEIN TOTAL g/dL 6.0*   ALT U/L 14   AST U/L 18     Assessment/Plan      Donna J Gonda is a 84 y.o. female with PMHX  DVT on Eliquis, hx R breast ca s/p lumpectomy, CKD3a, dementia baseline A&O x3, chronic low back pain, HTN, HLD, T2DM, left iliopsoas tendon rupture who presents for metabolic encephalopathy, admitted for suspected abdominal infection and iliacus hematoma.      ACUTE MEDICAL ISSUES  #acute metabolic encephalopathy   #abdominal pain  #concern for choledocholithiasis / cholangitis   #NATALIE on CKD3, baseline cr around 1.0, 1.35 on admission   ::Hx of cholecystectomy  -no recorded fever and no chills  per pt.   -CT abd pelvis: increased ductal dilation compared to previous   -Acute metabolic encephalopathy notably improved in ED after pt received fluids, so it may also be due to dehydration from being on Lasix recently, or from blood loss.   -1L fluids on admission.  Plan:  -ID following  -RUQ showed dilated common bile duct 1 cm and partially visualized dilated main pancreatic duct 5 mm.   -MRCP: main pancreatic duct dilation 7 mm and CBD dilation extending down to ampulla level. C/f ampullary stenosis or occult obstruction ampullary lesion.   -Continue zosyn (1/11-). Blood cultures pending.   -Continue 100/hr.  - Lipase: mildly elevated, amylase: negative  - CRP: elevated 6.96        #hx LLE iliopsoas rupture   #concern for L psoas and iliacus hematoma   #acute anemia, baseline hgb is ~13, 9.7 on admission   ::Follows with ortho, Dr. Duran  -seen on CT abd pelvis w contrast. Hold Eliquis for now. anemia workup.   Plan:  - Transfuse as needed   - H&H: Hgb 10.2, improving  - Hold home Eliquis  - Ortho consulted who did not recommend surgical intervention. Suggesting IR may be consulted for hematoma aspiration.   - PT/OT        #BLE edema   #hx DVT   -Doubt current dvts as pt is taking Eliquis at facility, though unable to verify if she has been getting it.   -No pitting edema to BLEs on admission;   Plan:  -HOLD Eliquis due to concern for hematoma  -Consider ordering an echo to evaluate for heart failure.   -Consider BLE US if she develops pitting edema or other signs of DVT.         #Rash  #RLE wound  ::Low c/f cellulitis (no warmth, ttp)  - Isolated non itchy rash on lower R leg  Plan:  - RLE wound does not look acutely infected. Wound care consult.   - Monitor           RESOLVED MEDICAL ISSUES:        CHRONIC MEDICAL ISSUES:     #HTN - hold valsartan hctz 160-12.5 and metoprolol succinate 50 in setting of possible infection and hematoma   #chronic neuropathy - continue gabapentin 300mg bid as 200 bid due to  kidney function   #T2DM - hold home Ozempic, metformin. SSI   #HLD - continue statin   #GERD - continue famotidine renally dosed            Fluids:  100 ml/hr NS maintenance   Electrolytes: Replete PRN  Nutrition: Fat restricted diet  GI Prophylaxis:  Famotidine  DVT Prophylaxis: Eliquis (held for hematoma), Reason:  Afib   BM:       Access: PIV  Antibiotics:   - Zosyn (1/11-)  Oxygenation: RA     Emergency Contact: Extended Emergency Contact Information  Primary Emergency Contact: ClaribelneAnnamaria  Home Phone: [phone]  Relation: Other  Secondary Emergency Contact: Sister Moore  Home Phone: [phone]  Relation: None    Yogesh Cerda MD  Internal Medicine, PGY-2

## 2025-01-12 NOTE — PROGRESS NOTES
"Donna J Gonda is a 84 y.o. female on day 1 of admission presenting with Altered mental status, unspecified altered mental status type.    Subjective   No pain at rest.  States she does have pain with attempts at range of motion of the left lower extremity.       Objective     Physical Exam alert and oriented x 3.  She appears comfortable laying supine.  Still having pain with any type of range of motion of the hip.  Remains neurologically intact and capillary refill brisk.    Last Recorded Vitals  Blood pressure 124/77, pulse 85, temperature 36.2 °C (97.2 °F), temperature source Temporal, resp. rate 16, height 1.575 m (5' 2.01\"), weight 88 kg (194 lb 0.1 oz), SpO2 100%.  Intake/Output last 3 Shifts:  I/O last 3 completed shifts:  In: 3910 (44.4 mL/kg) [P.O.:120; I.V.:2000 (22.7 mL/kg); IV Piggyback:1790]  Out: 2200 (25 mL/kg) [Urine:2200 (0.7 mL/kg/hr)]  Weight: 88 kg     Relevant Results      Scheduled medications  [Held by provider] apixaban, 5 mg, oral, BID  calcium carbonate-vitamin D3, 1 tablet, oral, Daily  famotidine, 20 mg, oral, Daily  gabapentin, 200 mg, oral, BID  insulin lispro, 0-10 Units, subcutaneous, q4h  [Held by provider] metoprolol succinate XL, 50 mg, oral, Daily  piperacillin-tazobactam, 3.375 g, intravenous, q6h  pravastatin, 20 mg, oral, Nightly  sennosides-docusate sodium, 2 tablet, oral, Nightly  [Held by provider] valsartan 160 mg, hydroCHLOROthiazide 12.5 mg for Diovan HCT, , oral, Daily      Continuous medications     PRN medications  PRN medications: acetaminophen **OR** acetaminophen, dextrose, dextrose, glucagon, glucagon, HYDROmorphone, HYDROmorphone  Results for orders placed or performed during the hospital encounter of 01/11/25 (from the past 24 hours)   Folate   Result Value Ref Range    Folate, Serum 11.8 >5.0 ng/mL   Hemoglobin and Hematocrit, Blood   Result Value Ref Range    Hemoglobin 10.3 (L) 12.0 - 16.0 g/dL    Hematocrit 32.3 (L) 36.0 - 46.0 %   POCT GLUCOSE   Result Value " Ref Range    POCT Glucose 129 (H) 74 - 99 mg/dL   POCT GLUCOSE   Result Value Ref Range    POCT Glucose 170 (H) 74 - 99 mg/dL   POCT GLUCOSE   Result Value Ref Range    POCT Glucose 115 (H) 74 - 99 mg/dL   CBC   Result Value Ref Range    WBC 6.8 4.4 - 11.3 x10*3/uL    nRBC 0.0 0.0 - 0.0 /100 WBCs    RBC 3.26 (L) 4.00 - 5.20 x10*6/uL    Hemoglobin 9.7 (L) 12.0 - 16.0 g/dL    Hematocrit 30.7 (L) 36.0 - 46.0 %    MCV 94 80 - 100 fL    MCH 29.8 26.0 - 34.0 pg    MCHC 31.6 (L) 32.0 - 36.0 g/dL    RDW 13.2 11.5 - 14.5 %    Platelets 242 150 - 450 x10*3/uL   Comprehensive metabolic panel   Result Value Ref Range    Glucose 127 (H) 74 - 99 mg/dL    Sodium 140 136 - 145 mmol/L    Potassium 4.2 3.5 - 5.3 mmol/L    Chloride 103 98 - 107 mmol/L    Bicarbonate 28 21 - 32 mmol/L    Anion Gap 13 10 - 20 mmol/L    Urea Nitrogen 25 (H) 6 - 23 mg/dL    Creatinine 1.31 (H) 0.50 - 1.05 mg/dL    eGFR 40 (L) >60 mL/min/1.73m*2    Calcium 9.2 8.6 - 10.3 mg/dL    Albumin 3.0 (L) 3.4 - 5.0 g/dL    Alkaline Phosphatase 47 33 - 136 U/L    Total Protein 6.0 (L) 6.4 - 8.2 g/dL    AST 18 9 - 39 U/L    Bilirubin, Total 0.6 0.0 - 1.2 mg/dL    ALT 14 7 - 45 U/L   Magnesium   Result Value Ref Range    Magnesium 1.68 1.60 - 2.40 mg/dL   Phosphorus   Result Value Ref Range    Phosphorus 3.6 2.5 - 4.9 mg/dL   POCT GLUCOSE   Result Value Ref Range    POCT Glucose 134 (H) 74 - 99 mg/dL                            Assessment/Plan   Assessment & Plan  Altered mental status, unspecified altered mental status type    In review of patient's medical record from the office she was currently being worked up for this same left lower extremity pain.  Pain symptoms are more than likely secondary to the ruptured iliopsoas tendon in addition to severe osteoarthritic change of the left knee.  There is no surgical intervention that is necessary for tendon rupture.  Additionally patient is not a great candidate for knee arthroplasty.  Consideration for aspiration of the  hematoma by interventional radiology if there is concern for an infectious process.  Patient may weight-bear as tolerated on the extremity.  Pain management per primary service.  Interestingly the patient also suffered a right sided iliopsoas tendon rupture a month or 2 ago.  That has been treated conservatively.  Good return of function.       I spent 10 minutes in the professional and overall care of this patient.      Jagjit Torres, DO

## 2025-01-12 NOTE — CARE PLAN
The patient's goals for the shift include      The clinical goals for the shift include Patient will remain safer through out shift    Over the shift, the patient did not make progress toward the following goals. Barriers to progression include patient still having lethragy at times. Recommendations to address these barriers include check on patients needs every hour to ensure safety

## 2025-01-13 LAB
ALBUMIN SERPL BCP-MCNC: 3.1 G/DL (ref 3.4–5)
ALP SERPL-CCNC: 47 U/L (ref 33–136)
ALT SERPL W P-5'-P-CCNC: 14 U/L (ref 7–45)
ANION GAP SERPL CALC-SCNC: 12 MMOL/L (ref 10–20)
AST SERPL W P-5'-P-CCNC: 19 U/L (ref 9–39)
ATRIAL RATE: 86 BPM
BILIRUB SERPL-MCNC: 0.6 MG/DL (ref 0–1.2)
BUN SERPL-MCNC: 20 MG/DL (ref 6–23)
CALCIUM SERPL-MCNC: 9 MG/DL (ref 8.6–10.3)
CHLORIDE SERPL-SCNC: 105 MMOL/L (ref 98–107)
CO2 SERPL-SCNC: 27 MMOL/L (ref 21–32)
CREAT SERPL-MCNC: 1.37 MG/DL (ref 0.5–1.05)
EGFRCR SERPLBLD CKD-EPI 2021: 38 ML/MIN/1.73M*2
ERYTHROCYTE [DISTWIDTH] IN BLOOD BY AUTOMATED COUNT: 13.2 % (ref 11.5–14.5)
GLUCOSE BLD MANUAL STRIP-MCNC: 119 MG/DL (ref 74–99)
GLUCOSE BLD MANUAL STRIP-MCNC: 120 MG/DL (ref 74–99)
GLUCOSE BLD MANUAL STRIP-MCNC: 121 MG/DL (ref 74–99)
GLUCOSE BLD MANUAL STRIP-MCNC: 128 MG/DL (ref 74–99)
GLUCOSE BLD MANUAL STRIP-MCNC: 164 MG/DL (ref 74–99)
GLUCOSE BLD MANUAL STRIP-MCNC: 182 MG/DL (ref 74–99)
GLUCOSE SERPL-MCNC: 119 MG/DL (ref 74–99)
HCT VFR BLD AUTO: 30.2 % (ref 36–46)
HGB BLD-MCNC: 9.6 G/DL (ref 12–16)
MAGNESIUM SERPL-MCNC: 1.87 MG/DL (ref 1.6–2.4)
MCH RBC QN AUTO: 29.8 PG (ref 26–34)
MCHC RBC AUTO-ENTMCNC: 31.8 G/DL (ref 32–36)
MCV RBC AUTO: 94 FL (ref 80–100)
NRBC BLD-RTO: 0 /100 WBCS (ref 0–0)
P AXIS: 51 DEGREES
P OFFSET: 141 MS
P ONSET: 86 MS
PHOSPHATE SERPL-MCNC: 3.3 MG/DL (ref 2.5–4.9)
PLATELET # BLD AUTO: 243 X10*3/UL (ref 150–450)
POTASSIUM SERPL-SCNC: 4 MMOL/L (ref 3.5–5.3)
PR INTERVAL: 240 MS
PROT SERPL-MCNC: 6.1 G/DL (ref 6.4–8.2)
Q ONSET: 206 MS
QRS COUNT: 14 BEATS
QRS DURATION: 122 MS
QT INTERVAL: 390 MS
QTC CALCULATION(BAZETT): 466 MS
QTC FREDERICIA: 440 MS
R AXIS: -68 DEGREES
RBC # BLD AUTO: 3.22 X10*6/UL (ref 4–5.2)
SODIUM SERPL-SCNC: 140 MMOL/L (ref 136–145)
T AXIS: 90 DEGREES
T OFFSET: 401 MS
VENTRICULAR RATE: 86 BPM
WBC # BLD AUTO: 6.6 X10*3/UL (ref 4.4–11.3)

## 2025-01-13 PROCEDURE — 2500000001 HC RX 250 WO HCPCS SELF ADMINISTERED DRUGS (ALT 637 FOR MEDICARE OP)

## 2025-01-13 PROCEDURE — 36415 COLL VENOUS BLD VENIPUNCTURE: CPT

## 2025-01-13 PROCEDURE — 99232 SBSQ HOSP IP/OBS MODERATE 35: CPT

## 2025-01-13 PROCEDURE — 83735 ASSAY OF MAGNESIUM: CPT

## 2025-01-13 PROCEDURE — 85027 COMPLETE CBC AUTOMATED: CPT

## 2025-01-13 PROCEDURE — 2500000002 HC RX 250 W HCPCS SELF ADMINISTERED DRUGS (ALT 637 FOR MEDICARE OP, ALT 636 FOR OP/ED)

## 2025-01-13 PROCEDURE — 84100 ASSAY OF PHOSPHORUS: CPT

## 2025-01-13 PROCEDURE — 80053 COMPREHEN METABOLIC PANEL: CPT

## 2025-01-13 PROCEDURE — 82947 ASSAY GLUCOSE BLOOD QUANT: CPT

## 2025-01-13 PROCEDURE — 1100000001 HC PRIVATE ROOM DAILY

## 2025-01-13 PROCEDURE — 2500000004 HC RX 250 GENERAL PHARMACY W/ HCPCS (ALT 636 FOR OP/ED)

## 2025-01-13 PROCEDURE — 99221 1ST HOSP IP/OBS SF/LOW 40: CPT | Performed by: INTERNAL MEDICINE

## 2025-01-13 RX ORDER — EAR PLUGS
1 EACH OTIC (EAR) 2 TIMES DAILY
Status: DISCONTINUED | OUTPATIENT
Start: 2025-01-13 | End: 2025-01-15 | Stop reason: HOSPADM

## 2025-01-13 RX ADMIN — Medication 1 APPLICATION: at 20:59

## 2025-01-13 RX ADMIN — PIPERACILLIN SODIUM AND TAZOBACTAM SODIUM 3.38 G: 3; .375 INJECTION, SOLUTION INTRAVENOUS at 18:39

## 2025-01-13 RX ADMIN — PIPERACILLIN SODIUM AND TAZOBACTAM SODIUM 3.38 G: 3; .375 INJECTION, SOLUTION INTRAVENOUS at 13:00

## 2025-01-13 RX ADMIN — PIPERACILLIN SODIUM AND TAZOBACTAM SODIUM 3.38 G: 3; .375 INJECTION, SOLUTION INTRAVENOUS at 06:07

## 2025-01-13 RX ADMIN — FAMOTIDINE 20 MG: 20 TABLET, FILM COATED ORAL at 08:25

## 2025-01-13 RX ADMIN — SENNOSIDES AND DOCUSATE SODIUM 2 TABLET: 50; 8.6 TABLET ORAL at 20:55

## 2025-01-13 RX ADMIN — INSULIN LISPRO 2 UNITS: 100 INJECTION, SOLUTION INTRAVENOUS; SUBCUTANEOUS at 12:58

## 2025-01-13 RX ADMIN — APIXABAN 5 MG: 5 TABLET, FILM COATED ORAL at 20:55

## 2025-01-13 RX ADMIN — INSULIN LISPRO 2 UNITS: 100 INJECTION, SOLUTION INTRAVENOUS; SUBCUTANEOUS at 20:55

## 2025-01-13 RX ADMIN — Medication 1 APPLICATION: at 14:56

## 2025-01-13 RX ADMIN — GABAPENTIN 200 MG: 100 CAPSULE ORAL at 08:25

## 2025-01-13 RX ADMIN — Medication 1 TABLET: at 08:25

## 2025-01-13 RX ADMIN — PRAVASTATIN SODIUM 20 MG: 40 TABLET ORAL at 20:55

## 2025-01-13 RX ADMIN — GABAPENTIN 200 MG: 100 CAPSULE ORAL at 20:55

## 2025-01-13 RX ADMIN — PIPERACILLIN SODIUM AND TAZOBACTAM SODIUM 3.38 G: 3; .375 INJECTION, SOLUTION INTRAVENOUS at 00:44

## 2025-01-13 ASSESSMENT — COGNITIVE AND FUNCTIONAL STATUS - GENERAL
MOVING TO AND FROM BED TO CHAIR: A LOT
STANDING UP FROM CHAIR USING ARMS: A LOT
MOBILITY SCORE: 12
TURNING FROM BACK TO SIDE WHILE IN FLAT BAD: A LITTLE
DRESSING REGULAR LOWER BODY CLOTHING: A LOT
DAILY ACTIVITIY SCORE: 14
MOVING FROM LYING ON BACK TO SITTING ON SIDE OF FLAT BED WITH BEDRAILS: A LITTLE
EATING MEALS: A LITTLE
PERSONAL GROOMING: A LITTLE
TOILETING: A LOT
WALKING IN HOSPITAL ROOM: TOTAL
CLIMB 3 TO 5 STEPS WITH RAILING: TOTAL
HELP NEEDED FOR BATHING: A LOT
DRESSING REGULAR UPPER BODY CLOTHING: A LOT

## 2025-01-13 ASSESSMENT — PAIN SCALES - GENERAL
PAINLEVEL_OUTOF10: 0 - NO PAIN
PAINLEVEL_OUTOF10: 0 - NO PAIN

## 2025-01-13 ASSESSMENT — PAIN - FUNCTIONAL ASSESSMENT: PAIN_FUNCTIONAL_ASSESSMENT: 0-10

## 2025-01-13 ASSESSMENT — ENCOUNTER SYMPTOMS
ABDOMINAL DISTENTION: 0
ABDOMINAL PAIN: 0
ACTIVITY CHANGE: 1

## 2025-01-13 NOTE — CONSULTS
Reason For Consult  CBD and pancreatic duct dilation    History Of Present Illness  Donna J Gonda is a 84 y.o. female with PMH of obesity, HTN, DM, CKD, DVT, left iliopsoas tendon rupture presented to ED with altered mental status due to likely metabolic encephalopathy, admitted for suspected abdominal infection and iliacus hematoma.  Patient baseline ANO x 3, upon assessment patient is oriented to 3.  History notes dementia.    Patient seen at office visit 1/9/2025 as she has been having increased confusion and swelling in her legs for the past few days.  Patient had complete left iliopsoas tendon rupture few months ago with moderate iliopsoas tendinosis and muscular edema in October on MRI scan.   Patient consulted to GI for CT abdomen noting intrahepatic biliary dilation, CBD and pancreatic duct dilation slightly more dilated than before.  LFTs WNL.  Patient denies abdominal or epigastric pain at this time.  Neil sign negative.  WBC WNL, H&H 9.6/30.2.   CT A/P  New central intrahepatic biliary dilatation with mild increased  distention of the common bile duct compared to prior CT in 2013, now measuring up to 11 mm in diameter, without evidence of choledocholithiasis. Additionally, there is slight increase in the dilatation of the pancreatic duct compared to 2013, now measuring up to 6 mm compared to 4 mm previously. Finding is of unclear etiology, although differential includes a mass at the sphincter of Oddi,  radiolucent choledocholithiasis in the distal CBD, and ascending cholangitis.  RUQ US   -Common bile duct is dilated measuring 1 cm in diameter. Distal common bile duct is obscured by bowel gas. Intrahepatic biliary ductal dilation also present.  -  Pancreatic visualization is limited due to obscuration from bowel gas. There is dilation of the main pancreatic duct measuring 5 mm in diameter at the pancreatic head level similar to CT.  MRCP  1.  Mild-moderate diffuse intrahepatic biliary dilation as well  as diffuse dilation of the common bile duct measuring 1 cm in diameter. No biliary intraductal filling defect.  2. Diffuse dilation of the main pancreatic duct measuring up to 7 mm in diameter at the pancreatic head level. Multiple mildly dilated pancreatic ductal side branches and subcentimeter cystic foci throughout the pancreas. No enhancing pancreatic lesion identified.  3. Both the dilated main pancreatic duct and dilated common bile duct extend to the ampulla level. Ampullary stenosis or occult obstructing ampullary lesion remain considerations.    Past Medical History  She has no past medical history on file.    Surgical History  She has no past surgical history on file.     Social History  She reports that she has never smoked. She has never used smokeless tobacco. She reports that she does not drink alcohol and does not use drugs.    Family History  No family history on file.     Allergies  Patient has no known allergies.    Review of Systems  Review of Systems   Constitutional:  Positive for activity change.   Gastrointestinal:  Negative for abdominal distention and abdominal pain.   All other systems reviewed and are negative.        Physical Exam  Physical Exam  Vitals reviewed.   Constitutional:       Appearance: Normal appearance.   HENT:      Head: Normocephalic and atraumatic.      Mouth/Throat:      Mouth: Mucous membranes are moist.   Eyes:      Extraocular Movements: Extraocular movements intact.   Cardiovascular:      Rate and Rhythm: Normal rate and regular rhythm.   Pulmonary:      Breath sounds: Normal breath sounds.   Abdominal:      General: Bowel sounds are normal. There is no distension.      Palpations: Abdomen is soft.      Tenderness: There is no abdominal tenderness. There is no guarding or rebound.   Musculoskeletal:      Cervical back: Normal range of motion.      Right lower leg: Edema present.      Left lower leg: Edema present.   Skin:     General: Skin is warm and dry.       "Coloration: Skin is pale.      Findings: Erythema present.   Neurological:      General: No focal deficit present.      Mental Status: She is alert and oriented to person, place, and time.   Psychiatric:         Mood and Affect: Mood normal.         Behavior: Behavior normal.           Last Recorded Vitals  Blood pressure 148/76, pulse 96, temperature 36.6 °C (97.9 °F), temperature source Temporal, resp. rate 17, height 1.575 m (5' 2.01\"), weight 88 kg (194 lb 0.1 oz), SpO2 96%.    Relevant Results      Results for orders placed or performed during the hospital encounter of 01/11/25 (from the past 24 hours)   POCT GLUCOSE   Result Value Ref Range    POCT Glucose 177 (H) 74 - 99 mg/dL   POCT GLUCOSE   Result Value Ref Range    POCT Glucose 136 (H) 74 - 99 mg/dL   POCT GLUCOSE   Result Value Ref Range    POCT Glucose 126 (H) 74 - 99 mg/dL   POCT GLUCOSE   Result Value Ref Range    POCT Glucose 119 (H) 74 - 99 mg/dL   POCT GLUCOSE   Result Value Ref Range    POCT Glucose 120 (H) 74 - 99 mg/dL   CBC   Result Value Ref Range    WBC 6.6 4.4 - 11.3 x10*3/uL    nRBC 0.0 0.0 - 0.0 /100 WBCs    RBC 3.22 (L) 4.00 - 5.20 x10*6/uL    Hemoglobin 9.6 (L) 12.0 - 16.0 g/dL    Hematocrit 30.2 (L) 36.0 - 46.0 %    MCV 94 80 - 100 fL    MCH 29.8 26.0 - 34.0 pg    MCHC 31.8 (L) 32.0 - 36.0 g/dL    RDW 13.2 11.5 - 14.5 %    Platelets 243 150 - 450 x10*3/uL   Comprehensive metabolic panel   Result Value Ref Range    Glucose 119 (H) 74 - 99 mg/dL    Sodium 140 136 - 145 mmol/L    Potassium 4.0 3.5 - 5.3 mmol/L    Chloride 105 98 - 107 mmol/L    Bicarbonate 27 21 - 32 mmol/L    Anion Gap 12 10 - 20 mmol/L    Urea Nitrogen 20 6 - 23 mg/dL    Creatinine 1.37 (H) 0.50 - 1.05 mg/dL    eGFR 38 (L) >60 mL/min/1.73m*2    Calcium 9.0 8.6 - 10.3 mg/dL    Albumin 3.1 (L) 3.4 - 5.0 g/dL    Alkaline Phosphatase 47 33 - 136 U/L    Total Protein 6.1 (L) 6.4 - 8.2 g/dL    AST 19 9 - 39 U/L    Bilirubin, Total 0.6 0.0 - 1.2 mg/dL    ALT 14 7 - 45 U/L "   Magnesium   Result Value Ref Range    Magnesium 1.87 1.60 - 2.40 mg/dL   Phosphorus   Result Value Ref Range    Phosphorus 3.3 2.5 - 4.9 mg/dL   POCT GLUCOSE   Result Value Ref Range    POCT Glucose 128 (H) 74 - 99 mg/dL      Scheduled medications  [Held by provider] apixaban, 5 mg, oral, BID  calcium carbonate-vitamin D3, 1 tablet, oral, Daily  famotidine, 20 mg, oral, Daily  gabapentin, 200 mg, oral, BID  insulin lispro, 0-10 Units, subcutaneous, q4h  [Held by provider] metoprolol succinate XL, 50 mg, oral, Daily  piperacillin-tazobactam, 3.375 g, intravenous, q6h  pravastatin, 20 mg, oral, Nightly  sennosides-docusate sodium, 2 tablet, oral, Nightly  [Held by provider] valsartan 160 mg, hydroCHLOROthiazide 12.5 mg for Diovan HCT, , oral, Daily  zinc oxide, 1 Application, Topical, BID      Continuous medications     PRN medications  PRN medications: acetaminophen **OR** acetaminophen, dextrose, dextrose, glucagon, glucagon, HYDROmorphone, HYDROmorphone      MRCP pancreas w and wo IV contrast    Result Date: 1/11/2025  Interpreted By:  Adam Del Rosario, STUDY: MRCP PANCREAS W AND WO IV CONTRAST;  1/11/2025 1:10 pm   INDICATION: Signs/Symptoms:ruq tenderness concern for choledocho / cholangitis.     COMPARISON: Right upper quadrant ultrasound and CT abdomen/pelvis of same date.   ACCESSION NUMBER(S): [id number]   ORDERING CLINICIAN: DERRICK FREEDMAN   TECHNIQUE: Multisequence multiplanar imaging of the abdomen was performed with attention to the pancreas. MRCP sequences with MIPS reformations were included. Sequences were obtained both without as well as following intravenous administration of 17 mL Dotarem contrast.   FINDINGS: LIVER: Liver is not significantly enlarged there is some relative atrophy of the left hepatic lobe. Hepatic parenchyma is isointense on opposed phase sequences. No focal hepatic lesion is seen.   BILE DUCTS: There is diffuse dilation of the common bile duct measuring up to 1 cm in diameter  as well as mild-moderate diffuse intrahepatic biliary dilation. No filling defect is seen within the common bile duct. No discrete obstructing biliary lesion is identified.   GALLBLADDER: Surgically absent.   PANCREAS: There is diffuse dilation of the main pancreatic duct measuring up to 7 mm in diameter in the ventral segment of the pancreatic head and 4 mm in diameter in the pancreatic body and tail. Multiple mildly dilated ductal side branches and subcentimeter cystic foci are seen within pancreatic head as well as the pancreatic distal body and tail. There is conventional pancreatic ductal anatomy in the dilated main pancreatic duct extends to the major papilla level. No enhancing pancreatic lesion is seen.   SPLEEN: Spleen is not enlarged. No discrete splenic lesion.   ADRENAL GLANDS: Unremarkable.   KIDNEYS: There is a left renal mid aspect posterior T1 intermediate signal intensity, T2 mixed signal intensity mass measuring 2 x 2 x 2 cm. Following contrast administration there question of mild intrinsic enhancement of this lesion concerning for a small renal neoplasm.   A right renal upper pole round 1.1 cm T1 hyperintense, T2 intermediate signal intensity cortical lesion does not show enhancement compatible with a small complicated cyst containing debris.   A right renal mid lateral aspect round partially exophytic T1 hypointense, T2 hyperintense nonenhancing 4.7 cm cyst is present. There is also a right renal lower pole anterior partially exophytic 1.7 cm T2 hyperintense cyst. Multiple additional subcentimeter T2 hyperintense renal cortical cysts are present bilaterally. No hydronephrosis bilaterally.   LYMPH NODES: No lymphadenopathy is seen.   ABDOMINAL VESSELS: No aortic aneurysm. IVC is unremarkable.   BOWEL: Included portions of large and small bowel are not abnormally dilated.   PERITONEUM/RETROPERITONEUM: No free fluid is seen.   BONES AND LOWER THORAX: There is partial visualization of thoracolumbar  mild S-shaped scoliosis and multilevel degenerative changes of the spine.       1.  Mild-moderate diffuse intrahepatic biliary dilation as well as diffuse dilation of the common bile duct measuring 1 cm in diameter. No biliary intraductal filling defect. 2. Diffuse dilation of the main pancreatic duct measuring up to 7 mm in diameter at the pancreatic head level. Multiple mildly dilated pancreatic ductal side branches and subcentimeter cystic foci throughout the pancreas. No enhancing pancreatic lesion identified. 3. Both the dilated main pancreatic duct and dilated common bile duct extend to the ampulla level. Ampullary stenosis or occult obstructing ampullary lesion remain considerations. 4. Left renal mid posterior aspect indeterminate 2 cm T2 mixed signal intensity mass with possible mild intrinsic enhancement concerning for a small renal neoplasm. 5. Multiple additional varying sized renal cysts bilaterally as well as a right renal upper pole 1.1 cm nonenhancing complicated cyst containing debris.     MACRO: None   Signed by: Adam Del Rosario 1/11/2025 3:00 PM Dictation workstation:   [id number]    US right upper quadrant    Result Date: 1/11/2025  Interpreted By:  Adam Del Rosario, STUDY: US RIGHT UPPER QUADRANT;  1/11/2025 9:01 am   INDICATION: Signs/Symptoms:RUQ pain eval for choledocho. hx cholecystectomy.     COMPARISON: CT abdomen/pelvis of same date.   ACCESSION NUMBER(S): [id number]   ORDERING CLINICIAN: DERRICK FREEDMAN   TECHNIQUE: Real-time sonographic evaluation of the right upper quadrant was performed.   FINDINGS: LIVER: The liver is homogeneous in echotexture. No focal hepatic lesion is identified.  Liver measures 14.6 cm in sagittal dimension.   GALLBLADDER: Surgically absent.   BILIARY TREE: Common bile duct is dilated measuring 1 cm in diameter. Distal common bile duct is obscured by bowel gas. Intrahepatic biliary ductal dilation also present.   PANCREAS: Pancreatic visualization is limited  due to obscuration from bowel gas. There is dilation of the main pancreatic duct measuring 5 mm in diameter at the pancreatic head level similar to CT.   RIGHT KIDNEY: Right kidney measures  10cm in length.  No right hydronephrosis is seen. Right renal mid aspect round hypoechoic partially exophytic cyst measures 4.6 x 4.3 x 4.8 cm.       Intrahepatic and extrahepatic biliary dilation. Dilated common bile duct measuring 1 cm in diameter as well as partially visualized dilated main pancreatic duct measuring 5 mm in diameter. Distal common bile duct obscured by bowel gas. Ampullary level obstructing process can not be excluded. MRCP could be considered for further evaluation.   No focal hepatic abnormality demonstrated.   MACRO: None.   Signed by: Adam Del Rosario 1/11/2025 10:16 AM Dictation workstation:   [id number]    CT abdomen pelvis w IV contrast    Result Date: 1/11/2025  Interpreted By:  Pravin David, STUDY: CT ABDOMEN PELVIS W IV CONTRAST;  1/11/2025 2:37 am   INDICATION: Signs/Symptoms:RUQ abd pain.     COMPARISON: CT abdomen and pelvis dated 12/17/2013; MRI of the lumbar spine dated 09/12/2023.   ACCESSION NUMBER(S): [id number]   ORDERING CLINICIAN: RENALDO LOERA   TECHNIQUE: Contiguous axial images of the abdomen and pelvis were obtained after the intravenous administration of 75 mL of Omnipaque 350 iodinated contrast. Coronal and sagittal reformatted images were reconstructed from the axial data.   FINDINGS: LOWER CHEST: Atelectasis/scarring are present in the lower lobes bilaterally, without evidence of consolidation or pleural effusion. 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   Heart is normal in size without pericardial effusion.   Distal esophagus is unremarkable in appearance.     ABDOMEN/PELVIS:   Images are somewhat degraded by motion.   ABDOMINAL WALL: There is a ventral abdominal wall hernia along the midline. No acute abnormalities  identified in the cutaneous tissues of the   LIVER: No acute hepatic parenchymal abnormality is present.   BILE DUCTS: There is dilatation of the central intrahepatic biliary tree and common bile duct, somewhat increased from prior study in 2013.   GALLBLADDER: Gallbladder is surgically absent.   PANCREAS: No peripancreatic stranding is present. The proximal pancreatic duct is dilated, measuring up to 6 mm in diameter, previously measuring 4 mm in December of 2013.   SPLEEN: No acute splenic abnormality is present.   ADRENALS: Bilateral adrenal glands are unremarkable in appearance.   KIDNEYS, URETERS, BLADDER: Kidneys are symmetric in size and enhancement without hydronephrosis or radiopaque nephrolithiasis. Several exophytic cysts are present bilaterally, largest measuring up to 4.7 cm in size.   Ureters are not dilated. No bladder wall thickening is present.   REPRODUCTIVE ORGANS: Uterus is surgically absent. No adnexal masses are identified.   VESSELS: Mild vascular calcifications are present in the abdominal aorta. No acute vascular abnormality is identified. IVC is flattened.   RETROPERITONEUM/LYMPH NODES: The left psoas and iliacus muscles (series 201, image 78 and 113) demonstrate asymmetric enlargement compared to the contralateral right side, with somewhat indistinct appearance of the left iliacus, new since prior MRI of the lumbar spine on 09/12/2023. No enlarged lymph nodes.   BOWEL/MESENTERY/PERITONEUM: Scattered diverticula are present in the descending and sigmoid colon without evidence of acute diverticulitis. No inflammatory bowel wall thickening or dilatation. Appendix is not definitely identified, no secondary signs of acute appendicitis present in the right lower quadrant.   No ascites, free air, or fluid collection.     MUSCULOSKELETAL: No acute osseous abnormality. No suspicious osseous lesion. Multilevel degenerative changes of the lower thoracic and lumbar spine without compression fracture  or high-grade stenosis.       1. New central intrahepatic biliary dilatation with mild increased distention of the common bile duct compared to prior CT in 2013, now measuring up to 11 mm in diameter, without evidence of choledocholithiasis. Additionally, there is slight increase in the dilatation of the pancreatic duct compared to 2013, now measuring up to 6 mm compared to 4 mm previously. Finding is of unclear etiology, although differential includes a mass at the sphincter of Oddi, radiolucent choledocholithiasis in the distal CBD, and ascending cholangitis. 2. Asymmetric enlargement of the left psoas and iliacus muscles compared to the contralateral right side, new since prior MRI of the lumbar spine on 09/12/2023, with somewhat indistinct appearance of the iliacus near the left hip. Finding is suspicious for underlying hematoma of indeterminate chronicity, possibly subacute/chronic, although other infiltrative processes are not entirely excluded. Correlate with any history of trauma. 3. Flattened IVC. Correlate with fluid volume status and hypovolemia. 4. Numerous diverticula are present in the descending and sigmoid colon without evidence of acute diverticulitis.     MACRO: None.   Signed by: Pravin David 1/11/2025 3:06 AM Dictation workstation:   [id number]    XR chest 1 view    Result Date: 1/11/2025  Interpreted By:  Jacob Reyes, STUDY: XR CHEST 1 VIEW;  1/11/2025 1:05 am   INDICATION: Signs/Symptoms:AMS.   COMPARISON: Chest x-ray 03/29/2011   ACCESSION NUMBER(S): [id number]   ORDERING CLINICIAN: RENALDO LOERA   FINDINGS: Right-sided MediPort terminates in the SVC. Overlying leads noted.   CARDIOMEDIASTINAL SILHOUETTE: Cardiomediastinal silhouette is stable in size and configuration.   LUNGS: No consolidation, pleural effusion or pneumothorax. Left basilar atelectasis and or scarring.   ABDOMEN: No remarkable upper abdominal findings.   BONES: Multilevel degenerative changes of the spine.  Bilateral shoulder osteoarthrosis.       No acute cardiopulmonary process.   MACRO: None   Signed by: Jacob Reyes 1/11/2025 1:10 AM Dictation workstation:   [id number]    CT head wo IV contrast    Result Date: 1/11/2025  Interpreted By:  Jacob Reyes, STUDY: CT HEAD WO IV CONTRAST;  1/11/2025 1:00 am   INDICATION: Signs/Symptoms:ams.   COMPARISON: None.   ACCESSION NUMBER(S): [id number]   ORDERING CLINICIAN: RENALDO LOERA   TECHNIQUE: Axial noncontrast CT images of the head.   FINDINGS: BRAIN PARENCHYMA: Gray-white matter interfaces are preserved. No mass, mass effect or midline shift. Mild deep and periventricular white matter hypodensities are nonspecific, but favored to represent chronic small vessel ischemic changes.   HEMORRHAGE: No acute intracranial hemorrhage. VENTRICLES and EXTRA-AXIAL SPACES: Mild-to-moderate volume loss with prominence of the ventricles and sulci. EXTRACRANIAL SOFT TISSUES: Within normal limits. PARANASAL SINUSES/MASTOIDS: The visualized paranasal sinuses and mastoid air cells are aerated. CALVARIUM: No depressed skull fracture. No destructive osseous lesion.   OTHER FINDINGS: Atherosclerotic calcification of the carotid siphons and vertebral arteries.       No acute intracranial abnormality. If symptoms persist or there is high clinical suspicion further evaluation with MRI can be considered.   Chronic changes as noted above.   MACRO: None   Signed by: Jacob Reyes 1/11/2025 1:07 AM Dictation workstation:   [id number]    Assessment/Plan      84 y.o. female with PMH of obesity, HTN, DM, CKD, DVT, left iliopsoas tendon rupture presented to ED with altered mental status due to likely metabolic encephalopathy, admitted for suspected abdominal infection and iliacus hematoma.  Patient baseline ANO x 3, upon assessment patient is oriented to 3.  History notes dementia.    Patient consulted to GI for CT abdomen noting intrahepatic biliary dilation, CBD and pancreatic duct dilation  slightly more dilated than before. RUQ US and MRCP complete with supportive findings, MRCP noting no biliary intraductal filling defect. Ampullary stenosis or occult obstructing ampullary lesion remain considerations.    Etiology CBD and pancreatic duct dilation with no clinical signs of obstruction. Risk factors for nonobstructive dilation include >61 yo, history cholecystectomy.   Unlikely cholangitis as patient afebrile, no abdominal pain, LFTs and WBC wnl, no jaundice noted.   Can not rule out adenoma or malignant process  Conservative management at this time    -recommend outpatient EUS/ERCP, consider inpatient with onset presentation of clinical symptoms or increase LFTs   -daily CMP while admitted   -follow up advance GI outpatient- Dr Marcano prior to schedule EUS/ERCP  -advance diet as tolerated   -continue current treatment       GI will sign off   Plan discussed with Dr Star Butler, APRN-CNP  I saw and evaluated the patient. I personally obtained the key and critical portions of the history and physical exam or was physically present for key and critical portions performed by the NP. I reviewed the NP's documentation and discussed the patient with the NP. I agree with the NP's medical decision making as documented in the note.

## 2025-01-13 NOTE — PROGRESS NOTES
01/13/25 1459   Discharge Planning   Living Arrangements Other (Comment)   Support Systems [employer]/charles community   Assistance Needed WC/ walker/ ADL assist. From [facility].   Type of Residence Nursing home/residential care   Who is requesting discharge planning? Provider   Home or Post Acute Services Post acute facilities (Rehab/SNF/etc)   Type of Post Acute Facility Services Skilled nursing   Expected Discharge Disposition SNF   Does the patient need discharge transport arranged? Yes   RoundTrip coordination needed? Yes   Has discharge transport been arranged? No     1/13/2025 1502: Voicemail left for   Annamaria Austin [phone] at [facility] to discuss if they had any concerns with her care needs and returning to the healthcare center at [facility].     1/13/2025 1607: Call back from Heather DIAZ at [facility]- confirms plan will be to return to Healthcare Center but will need transport arranged at that time.

## 2025-01-13 NOTE — CARE PLAN
The patient's goals for the shift include      The clinical goals for the shift include Patient will remain comfortable through out shift    Over the shift, the patient did not make progress toward the following goals. Barriers to progression include patient was restless and confused through out the night. Recommendations to address these barriers include frequent reorientation and repositioning for comfort.

## 2025-01-13 NOTE — PROGRESS NOTES
"  Subjective    No acute events overnight. Pt was seen and examined by bedside this morning. Pt reported doing well and reported she is worried about her b/l LE weakness. She denied fever/chills, HA, n/v, chest pain, SOB, palpitations, abdominal pain.  Objective    Vitals  Visit Vitals  /76 (BP Location: Right arm, Patient Position: Lying)   Pulse 96   Temp 36.6 °C (97.9 °F) (Temporal)   Resp 17   Ht 1.575 m (5' 2.01\")   Wt 88 kg (194 lb 0.1 oz)   SpO2 96%   BMI 35.47 kg/m²   Smoking Status Never   BSA 1.96 m²       Physical Exam   Constitutional: Appears stated age, obese.   HEENT: EOMI, clear sclera, moist mucous membranes  CV: RRR, No M/R/G  PULM: CTAB, no coughing or wheezing  ABDOMEN: Soft, NT/ND. No TTP  SKIN: Normal Color, Warm, Dry. Rash covered by bandage on R leg   EXTREMITIES: Non-Tender, Full ROM.   NEURO: A&O x 3  PSYCH: Normal Mood & Behavior       IOs    Intake/Output Summary (Last 24 hours) at 1/13/2025 1445  Last data filed at 1/13/2025 1337  Gross per 24 hour   Intake 730 ml   Output 1500 ml   Net -770 ml       Labs:   Results from last 72 hours   Lab Units 01/13/25  0717 01/12/25  0739 01/11/25  0711   SODIUM mmol/L 140 140 137   POTASSIUM mmol/L 4.0 4.2 3.6   CHLORIDE mmol/L 105 103 99   CO2 mmol/L 27 28 30   BUN mg/dL 20 25* 31*   CREATININE mg/dL 1.37* 1.31* 1.24*   GLUCOSE mg/dL 119* 127* 137*   CALCIUM mg/dL 9.0 9.2 9.2   ANION GAP mmol/L 12 13 12   EGFR mL/min/1.73m*2 38* 40* 42*   PHOSPHORUS mg/dL 3.3 3.6 3.9      Results from last 72 hours   Lab Units 01/13/25  0717 01/12/25  0739 01/11/25  1620 01/11/25  0711 01/11/25  0028   WBC AUTO x10*3/uL 6.6 6.8  --  5.9 6.8   HEMOGLOBIN g/dL 9.6* 9.7* 10.3* 8.8* 9.7*   HEMATOCRIT % 30.2* 30.7* 32.3* 26.6* 30.6*   PLATELETS AUTO x10*3/uL 243 242  --  227 222   NEUTROS PCT AUTO %  --   --   --   --  54.9   LYMPHS PCT AUTO %  --   --   --   --  29.9   MONOS PCT AUTO %  --   --   --   --  11.6   EOS PCT AUTO %  --   --   --   --  2.9      Lab " Results   Component Value Date    CALCIUM 9.0 01/13/2025    PHOS 3.3 01/13/2025      Lab Results   Component Value Date    CRP 6.96 (H) 01/11/2025        Lab Results   Component Value Date    URINECULTURE  01/10/2025     Growth indicates contamination with mixed bacterial elvin. Repeat culture if clinically indicated.    BLOODCULT No growth at 2 days 01/11/2025    BLOODCULT No growth at 2 days 01/11/2025       Images  MRCP pancreas w and wo IV contrast    Result Date: 1/11/2025  1.  Mild-moderate diffuse intrahepatic biliary dilation as well as diffuse dilation of the common bile duct measuring 1 cm in diameter. No biliary intraductal filling defect. 2. Diffuse dilation of the main pancreatic duct measuring up to 7 mm in diameter at the pancreatic head level. Multiple mildly dilated pancreatic ductal side branches and subcentimeter cystic foci throughout the pancreas. No enhancing pancreatic lesion identified. 3. Both the dilated main pancreatic duct and dilated common bile duct extend to the ampulla level. Ampullary stenosis or occult obstructing ampullary lesion remain considerations. 4. Left renal mid posterior aspect indeterminate 2 cm T2 mixed signal intensity mass with possible mild intrinsic enhancement concerning for a small renal neoplasm. 5. Multiple additional varying sized renal cysts bilaterally as well as a right renal upper pole 1.1 cm nonenhancing complicated cyst containing debris.     MACRO: None   Signed by: Adam Del Rosario 1/11/2025 3:00 PM Dictation workstation:   [id number]    US right upper quadrant    Result Date: 1/11/2025  Intrahepatic and extrahepatic biliary dilation. Dilated common bile duct measuring 1 cm in diameter as well as partially visualized dilated main pancreatic duct measuring 5 mm in diameter. Distal common bile duct obscured by bowel gas. Ampullary level obstructing process can not be excluded. MRCP could be considered for further evaluation.   No focal hepatic abnormality  demonstrated.   MACRO: None.   Signed by: Adam Del Rosario 1/11/2025 10:16 AM Dictation workstation:   [id number]    CT abdomen pelvis w IV contrast    Result Date: 1/11/2025  1. New central intrahepatic biliary dilatation with mild increased distention of the common bile duct compared to prior CT in 2013, now measuring up to 11 mm in diameter, without evidence of choledocholithiasis. Additionally, there is slight increase in the dilatation of the pancreatic duct compared to 2013, now measuring up to 6 mm compared to 4 mm previously. Finding is of unclear etiology, although differential includes a mass at the sphincter of Oddi, radiolucent choledocholithiasis in the distal CBD, and ascending cholangitis. 2. Asymmetric enlargement of the left psoas and iliacus muscles compared to the contralateral right side, new since prior MRI of the lumbar spine on 09/12/2023, with somewhat indistinct appearance of the iliacus near the left hip. Finding is suspicious for underlying hematoma of indeterminate chronicity, possibly subacute/chronic, although other infiltrative processes are not entirely excluded. Correlate with any history of trauma. 3. Flattened IVC. Correlate with fluid volume status and hypovolemia. 4. Numerous diverticula are present in the descending and sigmoid colon without evidence of acute diverticulitis.     MACRO: None.   Signed by: Pravin David 1/11/2025 3:06 AM Dictation workstation:   [id number]    XR chest 1 view    Result Date: 1/11/2025  No acute cardiopulmonary process.   MACRO: None   Signed by: Jacob Reyes 1/11/2025 1:10 AM Dictation workstation:   [id number]    CT head wo IV contrast    Result Date: 1/11/2025  No acute intracranial abnormality. If symptoms persist or there is high clinical suspicion further evaluation with MRI can be considered.   Chronic changes as noted above.   MACRO: None   Signed by: Jacob Reyes 1/11/2025 1:07 AM Dictation workstation:    [id number]      Meds  Scheduled medications  apixaban, 5 mg, oral, BID  calcium carbonate-vitamin D3, 1 tablet, oral, Daily  famotidine, 20 mg, oral, Daily  gabapentin, 200 mg, oral, BID  insulin lispro, 0-10 Units, subcutaneous, q4h  [Held by provider] metoprolol succinate XL, 50 mg, oral, Daily  piperacillin-tazobactam, 3.375 g, intravenous, q6h  pravastatin, 20 mg, oral, Nightly  sennosides-docusate sodium, 2 tablet, oral, Nightly  [Held by provider] valsartan 160 mg, hydroCHLOROthiazide 12.5 mg for Diovan HCT, , oral, Daily  zinc oxide, 1 Application, Topical, BID      Continuous medications     PRN medications  PRN medications: acetaminophen **OR** acetaminophen, dextrose, dextrose, glucagon, glucagon, HYDROmorphone, HYDROmorphone     Problem List    Problem list:   Patient Active Problem List   Diagnosis    Altered mental status, unspecified altered mental status type    Chronic bilateral low back pain with left-sided sciatica    Chronic kidney disease, stage 3b (Multi)    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 female with PMHX  DVT on Eliquis, hx R breast ca s/p lumpectomy, CKD3a, dementia baseline A&O x3, chronic low back pain, HTN, HLD, T2DM, left iliopsoas tendon rupture who presents for metabolic encephalopathy, admitted for suspected abdominal infection and iliacus hematoma.     Updates 01/13/2025  Per ortho, pt may continue taking home Eliquis  GI to evaluate pt for ampullary stenosis      ACUTE MEDICAL ISSUES  #acute metabolic encephalopathy   #abdominal pain  #concern for  choledocholithiasis / cholangitis   #NATALIE on CKD3, baseline cr around 1.0, 1.35 on admission   ::Hx of cholecystectomy  -no recorded fever and no chills per pt.   -CT abd pelvis: increased ductal dilation compared to previous   -Acute metabolic encephalopathy notably improved in ED after pt received fluids, so it may also be due to dehydration from being on Lasix recently, or from blood loss.   -1L fluids on admission.  Plan:  -GI consulted for CBD and pancreatic duct dilation   -ID following  -RUQ showed dilated common bile duct 1 cm and partially visualized dilated main pancreatic duct 5 mm.   -MRCP: main pancreatic duct dilation 7 mm and CBD dilation extending down to ampulla level. C/f ampullary stenosis or occult obstruction ampullary lesion.   -Continue zosyn (1/11-). Blood cultures NGTD    -Continue 100/hr.  -Lipase: mildly elevated, amylase: negative  -CRP: elevated 6.96        #hx LLE iliopsoas rupture   #concern for L psoas and iliacus hematoma   #acute anemia, baseline hgb is ~13, 9.7 on admission   ::Follows with ortho, Dr. Duran  -seen on CT abd pelvis w contrast. Hold Eliquis for now. anemia workup.   Plan:  - Transfuse as needed   - H&H: Hgb 10.2, improving  - Per ortho, continue home Eliquis  - Ortho consulted who did not recommend surgical intervention. Suggesting IR may be consulted for hematoma aspiration.   - PT/OT        #BLE edema   #hx DVT   -Doubt current dvts as pt is taking Eliquis at facility, though unable to verify if she has been getting it.   -No pitting edema to BLEs on admission;   Plan:  - Continue Eliquis  - Consider ordering an echo to evaluate for heart failure.   - Consider BLE US if she develops pitting edema or other signs of DVT.         #Rash  #RLE wound  ::Low c/f cellulitis (no warmth, ttp)  - Isolated non itchy rash on lower R leg  Plan:  - RLE wound does not look acutely infected. Wound care consult.   - Monitor        RESOLVED MEDICAL ISSUES:        CHRONIC MEDICAL  ISSUES:     #HTN - hold valsartan hctz 160-12.5 and metoprolol succinate 50 in setting of possible infection and hematoma   #chronic neuropathy - continue gabapentin 300mg bid as 200 bid due to kidney function   #T2DM - hold home Ozempic, metformin. SSI   #HLD - continue statin   #GERD - continue famotidine renally dosed      Fluids:  100 ml/hr NS maintenance   Electrolytes: Replete PRN  Nutrition: Fat restricted diet  GI Prophylaxis:  Famotidine  DVT Prophylaxis: Eliquis, Reason:  Afib   BM:       Access: PIV  Antibiotics:   - Zosyn (1/11-)  Oxygenation: RA    Dispo: 84 y.o.female admitted for iliacus hematoma and suspected abdominal infection      Kamala Lyle,   Internal Medicine, PGY-1

## 2025-01-13 NOTE — CONSULTS
Wound Care Consult     Visit Date: 1/13/2025      Patient Name: Donna J Gonda         MRN: [id number]           YOB: 1940     Reason for Consult:  Wounds / skin changes     Wound History:   Present on admission     Pertinent Labs:   Albumin   Date Value Ref Range Status   01/13/2025 3.1 (L) 3.4 - 5.0 g/dL Final       Wound Assessment:  Wound 01/11/25 Buttock (Active)   Wound Image   01/11/25 0319       Wound 01/11/25 Tibial Dorsal;Right (Active)   Wound Image   01/11/25 0320   Drainage Description None 01/13/25 1123   Drainage Amount None 01/13/25 1123   Dressing Xeroform;Silicone border dressing 01/13/25 1123   Dressing Changed New 01/13/25 1123   Dressing Status Dry;Clean 01/12/25 0835       Wound Team Summary Assessment:   83 y/o CF was admitted 1-11-25 and observed to have skin changes - EMR and images were reviewed.  She does have a very shallow, resurfacing wound to lateral, lower right leg which is round, 1.5 x 1.5 cm, no depth, no drainage. Goal:  continued moist healing / protection using Xeroform, Mepilex, change every other day. She also has dry, very small s/s scabbing to right buttock and incontinence - recommend 40% zinc oxide to heal / protect.  Orders were obtained and care provided.      Wound Team Plan: RLE - Xeroform and Mepilex, change every other day.  Zinc oxide 40%.     Chiki Luke RN, WCC, DWC  1/13/2025  4:24 PM

## 2025-01-13 NOTE — PROGRESS NOTES
Donna J Gonda is a 84 y.o. female on day 2 of admission presenting with Altered mental status, unspecified altered mental status type.    Subjective   Interval History: no fever, no new complaints        Review of Systems    Objective   Range of Vitals (last 24 hours)  Heart Rate:  [95-98]   Temp:  [36.3 °C (97.3 °F)-36.6 °C (97.9 °F)]   Resp:  [16-17]   BP: (125-148)/(59-76)   SpO2:  [96 %-98 %]   Daily Weight  01/11/25 : 88 kg (194 lb 0.1 oz)    Body mass index is 35.47 kg/m².    Physical Exam  Constitutional:       Appearance: Normal appearance.   HENT:      Head: Normocephalic and atraumatic.      Mouth/Throat:      Mouth: Mucous membranes are moist.      Pharynx: Oropharynx is clear.   Eyes:      Pupils: Pupils are equal, round, and reactive to light.   Cardiovascular:      Rate and Rhythm: Normal rate and regular rhythm.      Heart sounds: Normal heart sounds.   Pulmonary:      Effort: Pulmonary effort is normal.      Breath sounds: Normal breath sounds.   Abdominal:      General: Abdomen is flat. Bowel sounds are normal.      Palpations: Abdomen is soft.   Musculoskeletal:         General: Swelling present.      Cervical back: Normal range of motion.      Comments: stasis   Neurological:      Mental Status: She is alert.         Antibiotics  piperacillin-tazobactam - 3.375 gram/50 mL    Relevant Results  Labs  Results from last 72 hours   Lab Units 01/13/25  0717 01/12/25  0739 01/11/25  1620 01/11/25  0711 01/11/25  0028   WBC AUTO x10*3/uL 6.6 6.8  --  5.9 6.8   HEMOGLOBIN g/dL 9.6* 9.7* 10.3* 8.8* 9.7*   HEMATOCRIT % 30.2* 30.7* 32.3* 26.6* 30.6*   PLATELETS AUTO x10*3/uL 243 242  --  227 222   NEUTROS PCT AUTO %  --   --   --   --  54.9   LYMPHS PCT AUTO %  --   --   --   --  29.9   MONOS PCT AUTO %  --   --   --   --  11.6   EOS PCT AUTO %  --   --   --   --  2.9     Results from last 72 hours   Lab Units 01/13/25  0717 01/12/25  0739 01/11/25  0711   SODIUM mmol/L 140 140 137   POTASSIUM mmol/L 4.0 4.2  3.6   CHLORIDE mmol/L 105 103 99   CO2 mmol/L 27 28 30   BUN mg/dL 20 25* 31*   CREATININE mg/dL 1.37* 1.31* 1.24*   GLUCOSE mg/dL 119* 127* 137*   CALCIUM mg/dL 9.0 9.2 9.2   ANION GAP mmol/L 12 13 12   EGFR mL/min/1.73m*2 38* 40* 42*   PHOSPHORUS mg/dL 3.3 3.6 3.9     Results from last 72 hours   Lab Units 01/13/25  0717 01/12/25  0739 01/11/25  0711   ALK PHOS U/L 47 47 44   BILIRUBIN TOTAL mg/dL 0.6 0.6 0.5   PROTEIN TOTAL g/dL 6.1* 6.0* 5.8*   ALT U/L 14 14 14   AST U/L 19 18 20   ALBUMIN g/dL 3.1* 3.0* 3.0*     Estimated Creatinine Clearance: 30.4 mL/min (A) (by C-G formula based on SCr of 1.37 mg/dL (H)).  C-Reactive Protein   Date Value Ref Range Status   01/11/2025 6.96 (H) <1.00 mg/dL Final   01/11/2025 8.29 (H) <1.00 mg/dL Final     Microbiology  Reviewed  Imaging  Reviewed        Assessment/Plan   Encephalopathy, likely metabolic, resolved  Dilatated biliary tree, LFT are N, MRI with dilated biliary / pancreatic ducts, no obvious lesions, will await the GI input regarding the need for ERCP  Ruptured Lt iliopsoas tendon      Recommendations :  Continue Zosyn     I spent minutes in the professional and overall care of this patient.      Radha Dash MD

## 2025-01-14 LAB
ALBUMIN SERPL BCP-MCNC: 3.1 G/DL (ref 3.4–5)
ALBUMIN SERPL BCP-MCNC: 3.1 G/DL (ref 3.4–5)
ALP SERPL-CCNC: 46 U/L (ref 33–136)
ALT SERPL W P-5'-P-CCNC: 14 U/L (ref 7–45)
ANION GAP SERPL CALC-SCNC: 13 MMOL/L (ref 10–20)
ANION GAP SERPL CALC-SCNC: 14 MMOL/L (ref 10–20)
AST SERPL W P-5'-P-CCNC: 19 U/L (ref 9–39)
BILIRUB SERPL-MCNC: 0.6 MG/DL (ref 0–1.2)
BUN SERPL-MCNC: 18 MG/DL (ref 6–23)
BUN SERPL-MCNC: 20 MG/DL (ref 6–23)
CALCIUM SERPL-MCNC: 8.8 MG/DL (ref 8.6–10.3)
CALCIUM SERPL-MCNC: 9.1 MG/DL (ref 8.6–10.3)
CHLORIDE SERPL-SCNC: 104 MMOL/L (ref 98–107)
CHLORIDE SERPL-SCNC: 105 MMOL/L (ref 98–107)
CO2 SERPL-SCNC: 24 MMOL/L (ref 21–32)
CO2 SERPL-SCNC: 25 MMOL/L (ref 21–32)
CREAT SERPL-MCNC: 1.28 MG/DL (ref 0.5–1.05)
CREAT SERPL-MCNC: 1.42 MG/DL (ref 0.5–1.05)
EGFRCR SERPLBLD CKD-EPI 2021: 36 ML/MIN/1.73M*2
EGFRCR SERPLBLD CKD-EPI 2021: 41 ML/MIN/1.73M*2
ERYTHROCYTE [DISTWIDTH] IN BLOOD BY AUTOMATED COUNT: 13.2 % (ref 11.5–14.5)
GLUCOSE BLD MANUAL STRIP-MCNC: 100 MG/DL (ref 74–99)
GLUCOSE BLD MANUAL STRIP-MCNC: 101 MG/DL (ref 74–99)
GLUCOSE BLD MANUAL STRIP-MCNC: 123 MG/DL (ref 74–99)
GLUCOSE BLD MANUAL STRIP-MCNC: 131 MG/DL (ref 74–99)
GLUCOSE BLD MANUAL STRIP-MCNC: 133 MG/DL (ref 74–99)
GLUCOSE BLD MANUAL STRIP-MCNC: 141 MG/DL (ref 74–99)
GLUCOSE BLD MANUAL STRIP-MCNC: 186 MG/DL (ref 74–99)
GLUCOSE SERPL-MCNC: 113 MG/DL (ref 74–99)
GLUCOSE SERPL-MCNC: 144 MG/DL (ref 74–99)
HCT VFR BLD AUTO: 32.8 % (ref 36–46)
HGB BLD-MCNC: 10.2 G/DL (ref 12–16)
MAGNESIUM SERPL-MCNC: 1.95 MG/DL (ref 1.6–2.4)
MCH RBC QN AUTO: 29.7 PG (ref 26–34)
MCHC RBC AUTO-ENTMCNC: 31.1 G/DL (ref 32–36)
MCV RBC AUTO: 95 FL (ref 80–100)
NRBC BLD-RTO: 0 /100 WBCS (ref 0–0)
PHOSPHATE SERPL-MCNC: 3 MG/DL (ref 2.5–4.9)
PHOSPHATE SERPL-MCNC: 3 MG/DL (ref 2.5–4.9)
PLATELET # BLD AUTO: 227 X10*3/UL (ref 150–450)
POTASSIUM SERPL-SCNC: 4 MMOL/L (ref 3.5–5.3)
POTASSIUM SERPL-SCNC: 4.2 MMOL/L (ref 3.5–5.3)
PROT SERPL-MCNC: 6.4 G/DL (ref 6.4–8.2)
RBC # BLD AUTO: 3.44 X10*6/UL (ref 4–5.2)
SODIUM SERPL-SCNC: 138 MMOL/L (ref 136–145)
SODIUM SERPL-SCNC: 139 MMOL/L (ref 136–145)
WBC # BLD AUTO: 7.2 X10*3/UL (ref 4.4–11.3)

## 2025-01-14 PROCEDURE — 1100000001 HC PRIVATE ROOM DAILY

## 2025-01-14 PROCEDURE — 2500000001 HC RX 250 WO HCPCS SELF ADMINISTERED DRUGS (ALT 637 FOR MEDICARE OP): Performed by: INTERNAL MEDICINE

## 2025-01-14 PROCEDURE — 36415 COLL VENOUS BLD VENIPUNCTURE: CPT

## 2025-01-14 PROCEDURE — 82947 ASSAY GLUCOSE BLOOD QUANT: CPT

## 2025-01-14 PROCEDURE — 99232 SBSQ HOSP IP/OBS MODERATE 35: CPT

## 2025-01-14 PROCEDURE — 83735 ASSAY OF MAGNESIUM: CPT

## 2025-01-14 PROCEDURE — 80069 RENAL FUNCTION PANEL: CPT | Mod: CCI

## 2025-01-14 PROCEDURE — 2500000001 HC RX 250 WO HCPCS SELF ADMINISTERED DRUGS (ALT 637 FOR MEDICARE OP)

## 2025-01-14 PROCEDURE — 84100 ASSAY OF PHOSPHORUS: CPT

## 2025-01-14 PROCEDURE — 2500000004 HC RX 250 GENERAL PHARMACY W/ HCPCS (ALT 636 FOR OP/ED)

## 2025-01-14 PROCEDURE — 80053 COMPREHEN METABOLIC PANEL: CPT

## 2025-01-14 PROCEDURE — 85027 COMPLETE CBC AUTOMATED: CPT

## 2025-01-14 PROCEDURE — 2500000002 HC RX 250 W HCPCS SELF ADMINISTERED DRUGS (ALT 637 FOR MEDICARE OP, ALT 636 FOR OP/ED)

## 2025-01-14 RX ORDER — AMOXICILLIN AND CLAVULANATE POTASSIUM 875; 125 MG/1; MG/1
1 TABLET, FILM COATED ORAL 2 TIMES DAILY
Status: DISCONTINUED | OUTPATIENT
Start: 2025-01-14 | End: 2025-01-15 | Stop reason: HOSPADM

## 2025-01-14 RX ORDER — OXYCODONE HYDROCHLORIDE 5 MG/1
5 TABLET ORAL ONCE
Status: COMPLETED | OUTPATIENT
Start: 2025-01-15 | End: 2025-01-15

## 2025-01-14 RX ADMIN — SENNOSIDES AND DOCUSATE SODIUM 2 TABLET: 50; 8.6 TABLET ORAL at 23:30

## 2025-01-14 RX ADMIN — SODIUM CHLORIDE, SODIUM LACTATE, POTASSIUM CHLORIDE, AND CALCIUM CHLORIDE 1000 ML: 600; 310; 30; 20 INJECTION, SOLUTION INTRAVENOUS at 10:03

## 2025-01-14 RX ADMIN — PIPERACILLIN SODIUM AND TAZOBACTAM SODIUM 3.38 G: 3; .375 INJECTION, SOLUTION INTRAVENOUS at 00:10

## 2025-01-14 RX ADMIN — Medication 1 TABLET: at 09:17

## 2025-01-14 RX ADMIN — FAMOTIDINE 20 MG: 20 TABLET, FILM COATED ORAL at 09:18

## 2025-01-14 RX ADMIN — PRAVASTATIN SODIUM 20 MG: 40 TABLET ORAL at 23:30

## 2025-01-14 RX ADMIN — GABAPENTIN 200 MG: 100 CAPSULE ORAL at 09:17

## 2025-01-14 RX ADMIN — GABAPENTIN 200 MG: 100 CAPSULE ORAL at 23:30

## 2025-01-14 RX ADMIN — METOPROLOL SUCCINATE 50 MG: 50 TABLET, EXTENDED RELEASE ORAL at 09:18

## 2025-01-14 RX ADMIN — Medication 1 APPLICATION: at 10:03

## 2025-01-14 RX ADMIN — AMOXICILLIN AND CLAVULANATE POTASSIUM 1 TABLET: 875; 125 TABLET, FILM COATED ORAL at 23:30

## 2025-01-14 RX ADMIN — AMOXICILLIN AND CLAVULANATE POTASSIUM 1 TABLET: 875; 125 TABLET, FILM COATED ORAL at 15:04

## 2025-01-14 RX ADMIN — APIXABAN 5 MG: 5 TABLET, FILM COATED ORAL at 23:30

## 2025-01-14 RX ADMIN — APIXABAN 5 MG: 5 TABLET, FILM COATED ORAL at 09:17

## 2025-01-14 RX ADMIN — INSULIN LISPRO 2 UNITS: 100 INJECTION, SOLUTION INTRAVENOUS; SUBCUTANEOUS at 16:19

## 2025-01-14 RX ADMIN — PIPERACILLIN SODIUM AND TAZOBACTAM SODIUM 3.38 G: 3; .375 INJECTION, SOLUTION INTRAVENOUS at 06:14

## 2025-01-14 RX ADMIN — Medication 1 APPLICATION: at 20:57

## 2025-01-14 ASSESSMENT — PAIN SCALES - GENERAL: PAINLEVEL_OUTOF10: 0 - NO PAIN

## 2025-01-14 ASSESSMENT — COGNITIVE AND FUNCTIONAL STATUS - GENERAL
TOILETING: A LOT
PERSONAL GROOMING: A LITTLE
DAILY ACTIVITIY SCORE: 14
WALKING IN HOSPITAL ROOM: TOTAL
TURNING FROM BACK TO SIDE WHILE IN FLAT BAD: A LITTLE
WALKING IN HOSPITAL ROOM: TOTAL
EATING MEALS: A LITTLE
MOVING TO AND FROM BED TO CHAIR: A LOT
STANDING UP FROM CHAIR USING ARMS: A LOT
PERSONAL GROOMING: A LITTLE
DAILY ACTIVITIY SCORE: 14
CLIMB 3 TO 5 STEPS WITH RAILING: TOTAL
EATING MEALS: A LITTLE
MOVING FROM LYING ON BACK TO SITTING ON SIDE OF FLAT BED WITH BEDRAILS: A LITTLE
TOILETING: A LOT
MOBILITY SCORE: 12
DRESSING REGULAR UPPER BODY CLOTHING: A LOT
MOVING FROM LYING ON BACK TO SITTING ON SIDE OF FLAT BED WITH BEDRAILS: A LITTLE
DRESSING REGULAR UPPER BODY CLOTHING: A LOT
HELP NEEDED FOR BATHING: A LOT
MOBILITY SCORE: 12
STANDING UP FROM CHAIR USING ARMS: A LOT
CLIMB 3 TO 5 STEPS WITH RAILING: TOTAL
HELP NEEDED FOR BATHING: A LOT
TURNING FROM BACK TO SIDE WHILE IN FLAT BAD: A LITTLE
DRESSING REGULAR LOWER BODY CLOTHING: A LOT
MOVING TO AND FROM BED TO CHAIR: A LOT
DRESSING REGULAR LOWER BODY CLOTHING: A LOT

## 2025-01-14 NOTE — PROGRESS NOTES
Donna J Gonda is a 84 y.o. female on day 3 of admission presenting with Altered mental status, unspecified altered mental status type.    Subjective   Interval History: no fever, no new complaints        Review of Systems    Objective   Range of Vitals (last 24 hours)  Heart Rate:  [87-98]   Temp:  [36.4 °C (97.5 °F)-37 °C (98.6 °F)]   Resp:  [18]   BP: (125-162)/(52-83)   Weight:  [83.9 kg (184 lb 14.4 oz)]   SpO2:  [93 %-100 %]   Daily Weight  01/14/25 : 83.9 kg (184 lb 14.4 oz)    Body mass index is 33.81 kg/m².    Physical Exam  Constitutional:       Appearance: Normal appearance.   HENT:      Head: Normocephalic and atraumatic.      Mouth/Throat:      Mouth: Mucous membranes are moist.      Pharynx: Oropharynx is clear.   Eyes:      Pupils: Pupils are equal, round, and reactive to light.   Cardiovascular:      Rate and Rhythm: Normal rate and regular rhythm.      Heart sounds: Normal heart sounds.   Pulmonary:      Effort: Pulmonary effort is normal.      Breath sounds: Normal breath sounds.   Abdominal:      General: Abdomen is flat. Bowel sounds are normal.      Palpations: Abdomen is soft.   Musculoskeletal:         General: Swelling present.      Cervical back: Normal range of motion.      Comments: stasis   Neurological:      Mental Status: She is alert.         Antibiotics  This patient does not have an active medication from one of the medication groupers.    Relevant Results  Labs  Results from last 72 hours   Lab Units 01/14/25  0716 01/13/25  0717 01/12/25  0739   WBC AUTO x10*3/uL 7.2 6.6 6.8   HEMOGLOBIN g/dL 10.2* 9.6* 9.7*   HEMATOCRIT % 32.8* 30.2* 30.7*   PLATELETS AUTO x10*3/uL 227 243 242     Results from last 72 hours   Lab Units 01/14/25  0716 01/13/25  0717 01/12/25  0739   SODIUM mmol/L 139 140 140   POTASSIUM mmol/L 4.0 4.0 4.2   CHLORIDE mmol/L 105 105 103   CO2 mmol/L 24 27 28   BUN mg/dL 18 20 25*   CREATININE mg/dL 1.42* 1.37* 1.31*   GLUCOSE mg/dL 113* 119* 127*   CALCIUM mg/dL 9.1  9.0 9.2   ANION GAP mmol/L 14 12 13   EGFR mL/min/1.73m*2 36* 38* 40*   PHOSPHORUS mg/dL 3.0 3.3 3.6     Results from last 72 hours   Lab Units 01/14/25  0716 01/13/25  0717 01/12/25  0739   ALK PHOS U/L 46 47 47   BILIRUBIN TOTAL mg/dL 0.6 0.6 0.6   PROTEIN TOTAL g/dL 6.4 6.1* 6.0*   ALT U/L 14 14 14   AST U/L 19 19 18   ALBUMIN g/dL 3.1* 3.1* 3.0*     Estimated Creatinine Clearance: 28.6 mL/min (A) (by C-G formula based on SCr of 1.42 mg/dL (H)).  C-Reactive Protein   Date Value Ref Range Status   01/11/2025 6.96 (H) <1.00 mg/dL Final   01/11/2025 8.29 (H) <1.00 mg/dL Final     Microbiology  Reviewed  Imaging  Reviewed        Assessment/Plan   Encephalopathy, likely metabolic, resolved  Dilatated biliary tree, LFT are N, MRI with dilated biliary / pancreatic ducts, no obvious lesions  Ruptured Lt iliopsoas tendon      Recommendations :  Continue Zosyn, plan on 5 days of oral Augmentin with discharge      I spent minutes in the professional and overall care of this patient.      Radha Dash MD

## 2025-01-14 NOTE — CARE PLAN
The patient's goals for the shift include  sleep    The clinical goals for the shift include pt will get at least 6 hours of sleep throughout the night

## 2025-01-14 NOTE — PROGRESS NOTES
"  Subjective    No acute events overnight. Pt was seen and examined by bedside this morning. She was Aox3 and reported to be feeling well. She states having b/l pain in thighs which has now improved since admission. She denied any n/v, sob, abdominal pain, palpitations.     Objective    Vitals  Visit Vitals  /55 (BP Location: Right arm, Patient Position: Lying)   Pulse 87   Temp 36.4 °C (97.5 °F) (Temporal)   Resp 18   Ht 1.575 m (5' 2.01\")   Wt 83.9 kg (184 lb 14.4 oz)   SpO2 100%   BMI 33.81 kg/m²   Smoking Status Never   BSA 1.92 m²       Physical Exam   constitutional: Appears stated age, obese.   HEENT: EOMI, clear sclera  CV: RRR, No M/R/G  PULM: CTAB, no coughing or wheezing  ABDOMEN: Soft, NT/ND. No TTP  SKIN: Normal Color, Warm, Dry. Rash covered by bandage on R leg   EXTREMITIES: Non-Tender, Full ROM.   NEURO: A&O x 3  PSYCH: Normal Mood & Behavior     IOs    Intake/Output Summary (Last 24 hours) at 1/14/2025 1056  Last data filed at 1/14/2025 1000  Gross per 24 hour   Intake 680 ml   Output 1050 ml   Net -370 ml       Labs:   Results from last 72 hours   Lab Units 01/14/25  0716 01/13/25  0717 01/12/25  0739   SODIUM mmol/L 139 140 140   POTASSIUM mmol/L 4.0 4.0 4.2   CHLORIDE mmol/L 105 105 103   CO2 mmol/L 24 27 28   BUN mg/dL 18 20 25*   CREATININE mg/dL 1.42* 1.37* 1.31*   GLUCOSE mg/dL 113* 119* 127*   CALCIUM mg/dL 9.1 9.0 9.2   ANION GAP mmol/L 14 12 13   EGFR mL/min/1.73m*2 36* 38* 40*   PHOSPHORUS mg/dL 3.0 3.3 3.6      Results from last 72 hours   Lab Units 01/14/25  0716 01/13/25  0717 01/12/25  0739   WBC AUTO x10*3/uL 7.2 6.6 6.8   HEMOGLOBIN g/dL 10.2* 9.6* 9.7*   HEMATOCRIT % 32.8* 30.2* 30.7*   PLATELETS AUTO x10*3/uL 227 243 242      Lab Results   Component Value Date    CALCIUM 9.1 01/14/2025    PHOS 3.0 01/14/2025      Lab Results   Component Value Date    CRP 6.96 (H) 01/11/2025     Lab Results   Component Value Date    URINECULTURE  01/10/2025     Growth indicates contamination " with mixed bacterial elvin. Repeat culture if clinically indicated.    BLOODCULT No growth at 2 days 01/11/2025    BLOODCULT No growth at 2 days 01/11/2025       Images  MRCP pancreas w and wo IV contrast    Result Date: 1/11/2025  1.  Mild-moderate diffuse intrahepatic biliary dilation as well as diffuse dilation of the common bile duct measuring 1 cm in diameter. No biliary intraductal filling defect. 2. Diffuse dilation of the main pancreatic duct measuring up to 7 mm in diameter at the pancreatic head level. Multiple mildly dilated pancreatic ductal side branches and subcentimeter cystic foci throughout the pancreas. No enhancing pancreatic lesion identified. 3. Both the dilated main pancreatic duct and dilated common bile duct extend to the ampulla level. Ampullary stenosis or occult obstructing ampullary lesion remain considerations. 4. Left renal mid posterior aspect indeterminate 2 cm T2 mixed signal intensity mass with possible mild intrinsic enhancement concerning for a small renal neoplasm. 5. Multiple additional varying sized renal cysts bilaterally as well as a right renal upper pole 1.1 cm nonenhancing complicated cyst containing debris.     MACRO: None   Signed by: Adam Del Rosario 1/11/2025 3:00 PM Dictation workstation:   [id number]    US right upper quadrant    Result Date: 1/11/2025  Intrahepatic and extrahepatic biliary dilation. Dilated common bile duct measuring 1 cm in diameter as well as partially visualized dilated main pancreatic duct measuring 5 mm in diameter. Distal common bile duct obscured by bowel gas. Ampullary level obstructing process can not be excluded. MRCP could be considered for further evaluation.   No focal hepatic abnormality demonstrated.   MACRO: None.   Signed by: Adam Del Rosario 1/11/2025 10:16 AM Dictation workstation:   [id number]    CT abdomen pelvis w IV contrast    Result Date: 1/11/2025  1. New central intrahepatic biliary dilatation with mild increased  distention of the common bile duct compared to prior CT in 2013, now measuring up to 11 mm in diameter, without evidence of choledocholithiasis. Additionally, there is slight increase in the dilatation of the pancreatic duct compared to 2013, now measuring up to 6 mm compared to 4 mm previously. Finding is of unclear etiology, although differential includes a mass at the sphincter of Oddi, radiolucent choledocholithiasis in the distal CBD, and ascending cholangitis. 2. Asymmetric enlargement of the left psoas and iliacus muscles compared to the contralateral right side, new since prior MRI of the lumbar spine on 09/12/2023, with somewhat indistinct appearance of the iliacus near the left hip. Finding is suspicious for underlying hematoma of indeterminate chronicity, possibly subacute/chronic, although other infiltrative processes are not entirely excluded. Correlate with any history of trauma. 3. Flattened IVC. Correlate with fluid volume status and hypovolemia. 4. Numerous diverticula are present in the descending and sigmoid colon without evidence of acute diverticulitis.     MACRO: None.   Signed by: Pravin David 1/11/2025 3:06 AM Dictation workstation:   [id number]    XR chest 1 view    Result Date: 1/11/2025  No acute cardiopulmonary process.   MACRO: None   Signed by: Jacob Reyes 1/11/2025 1:10 AM Dictation workstation:   [id number]    CT head wo IV contrast    Result Date: 1/11/2025  No acute intracranial abnormality. If symptoms persist or there is high clinical suspicion further evaluation with MRI can be considered.   Chronic changes as noted above.   MACRO: None   Signed by: Jacob Reyes 1/11/2025 1:07 AM Dictation workstation:   [id number]      Meds  Scheduled medications  apixaban, 5 mg, oral, BID  calcium carbonate-vitamin D3, 1 tablet, oral, Daily  famotidine, 20 mg, oral, Daily  gabapentin, 200 mg, oral, BID  insulin lispro, 0-10 Units, subcutaneous, q4h  lactated Ringer's, 1,000 mL,  intravenous, Once  metoprolol succinate XL, 50 mg, oral, Daily  pravastatin, 20 mg, oral, Nightly  sennosides-docusate sodium, 2 tablet, oral, Nightly  [Held by provider] valsartan 160 mg, hydroCHLOROthiazide 12.5 mg for Diovan HCT, , oral, Daily  zinc oxide, 1 Application, Topical, BID      Continuous medications     PRN medications  PRN medications: acetaminophen **OR** acetaminophen, dextrose, dextrose, glucagon, glucagon     Problem List    Problem list:   Patient Active Problem List   Diagnosis    Altered mental status, unspecified altered mental status type    Chronic bilateral low back pain with left-sided sciatica    Chronic kidney disease, stage 3b (Multi)    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 female with PMHX  DVT on Eliquis, hx R breast ca s/p lumpectomy, CKD3a, dementia baseline A&O x3, chronic low back pain, HTN, HLD, T2DM, left iliopsoas tendon rupture who presents for metabolic encephalopathy, admitted for suspected abdominal infection and iliacus hematoma.      Updates 01/13/2025  Per ortho, pt may continue taking home Eliquis  GI recommends outpatient EUS/ERCP, may consider inpt if pt presents with clinical symptoms or increased LFTs   Discontinued Zosyn, Dilaudid      ACUTE MEDICAL ISSUES  #acute metabolic encephalopathy, improving    #abdominal pain   #concern for choledocholithiasis / cholangitis    ::Hx of cholecystectomy  -CT abd pelvis: increased ductal dilation compared to previous   -RUQ showed dilated common bile duct 1 cm and partially visualized dilated main pancreatic  duct 5 mm.   -MRCP: main pancreatic duct dilation 7 mm and CBD dilation extending down to ampulla level. C/f ampullary stenosis or occult obstruction ampullary lesion.   Plan:  -GI consulted for CBD and pancreatic duct dilation, recommends outpt EUS/ERCP, may consider inpt if pt presents with clinical symptoms or increased LFTs     -ID following  -Discontinued Zosyn (1/11-01/14)    #NATALIE on CKD3, (baseline Cr ~1.0), 1.35 on admission  ::Likely Prerenal    -Cr: 1.42  -Administer 1L LR   -Repeat RFP      #hx LLE iliopsoas rupture   #concern for L psoas and iliacus hematoma  #acute anemia, baseline hgb is ~13, 9.7 on admission, improving    ::Follows with ortho, Dr. Duran  -seen on CT abd pelvis w contrast. Hold Eliquis for now. anemia workup.   Plan:  - Transfuse PRN  - Per ortho, no surgical intervention at this time, may consult IR for hematoma aspiration.   - Continue Eliquis   - PT/OT        #BLE edema   #hx DVT   -Doubt current dvts as pt is taking Eliquis at facility, though unable to verify if she has been getting it.   -No pitting edema to BLEs on admission;   Plan:  - Continue Eliquis  - May consider BLE US if she develops pitting edema or other signs of DVT.         #Rash  #RLE wound  ::Low c/f cellulitis (no warmth, ttp)  - Isolated non itchy rash on lower R leg  Plan:  - RLE wound does not look acutely infected. Wound care consult.   - Monitor       CHRONIC MEDICAL ISSUES:  #HTN - hold valsartan hctz 160-12.5 and metoprolol succinate 50 in setting of possible infection and hematoma   #chronic neuropathy - continue gabapentin 300mg bid as 200 bid due to kidney function   #T2DM - hold home Ozempic, metformin. SSI   #HLD - continue statin   #GERD - continue famotidine renally dosed      Fluids: 1L NS Bolus   Electrolytes: Replete PRN  Nutrition: Fat restricted diet  GI Prophylaxis:  Famotidine  DVT Prophylaxis: Eliquis, Reason:  Afib   Access: PIV  Oxygenation: RA     Dispo: 84 y.o.female admitted for iliacus  hematoma and suspected abdominal infection       Kamala Lyle DO  Internal Medicine, PGY-1

## 2025-01-14 NOTE — PROGRESS NOTES
Pharmacy Medication History Review    Donna J Gonda is a 84 y.o. female admitted for Altered mental status, unspecified altered mental status type. Pharmacy reviewed the patient's fceor-sd-vewjswilh medications and allergies for accuracy.    The list below reflectives the updated PTA list. Please review each medication in order reconciliation for additional clarification and justification.    Prior to Admission Medications   Facility-Administered Medications:   Prescriptions Last Dose Informant Patient Reported? Taking?   Eliquis 5 mg tablet 2025  Yes Yes   Sig: Take 1 tablet (5 mg) by mouth 2 times a day.   Ozempic 0.25 mg or 0.5 mg (2 mg/3 mL) pen injector Past Week  Yes Yes   acetaminophen (Tylenol) 500 mg capsule 1/10/2025  Yes Yes   Sig: Take by mouth every 8 hours if needed.   gabapentin (Neurontin) 300 mg capsule 1/10/2025  Yes Yes   Si capsule (300 mg) every 12 hours.   lovastatin (Mevacor) 20 mg tablet 1/10/2025  Yes Yes   Sig: Take 1 tablet (20 mg) by mouth once daily.   metFORMIN (Glucophage) 500 mg tablet 1/10/2025  Yes Yes   Sig: Take 1 tablet (500 mg) by mouth once daily at bedtime.   metoprolol succinate XL (Toprol-XL) 50 mg 24 hr tablet 1/10/2025  Yes Yes   valsartan-hydrochlorothiazide (Diovan-HCT) 160-12.5 mg tablet 1/10/2025  Yes Yes      Facility-Administered Medications: None         The list below reflectives the updated allergy list. Please review each documented allergy for additional clarification and justification.  Allergies  Reviewed by Aline Tee on 2025   No Known Allergies         Alexx Miller, PharmD  PGY1 Pharmacy Resident

## 2025-01-14 NOTE — CARE PLAN
The patient's goals for the shift include      The clinical goals for the shift include pt. To remain free from injury.    Over the shift, the patient did make progress toward the following goals. Barriers to progression include lack of ambulation. Recommendations to address these barriers include [id number], skin care, medication administration, fluids, hydration/nutrition.

## 2025-01-15 VITALS
SYSTOLIC BLOOD PRESSURE: 137 MMHG | OXYGEN SATURATION: 97 % | HEART RATE: 88 BPM | WEIGHT: 184.9 LBS | DIASTOLIC BLOOD PRESSURE: 74 MMHG | TEMPERATURE: 97.9 F | RESPIRATION RATE: 18 BRPM | BODY MASS INDEX: 34.03 KG/M2 | HEIGHT: 62 IN

## 2025-01-15 PROBLEM — R41.82 ALTERED MENTAL STATUS, UNSPECIFIED ALTERED MENTAL STATUS TYPE: Status: RESOLVED | Noted: 2025-01-11 | Resolved: 2025-01-15

## 2025-01-15 LAB
ALBUMIN SERPL BCP-MCNC: 3 G/DL (ref 3.4–5)
ALP SERPL-CCNC: 44 U/L (ref 33–136)
ALT SERPL W P-5'-P-CCNC: 13 U/L (ref 7–45)
ANION GAP SERPL CALC-SCNC: 12 MMOL/L (ref 10–20)
AST SERPL W P-5'-P-CCNC: 17 U/L (ref 9–39)
BACTERIA BLD CULT: NORMAL
BACTERIA BLD CULT: NORMAL
BILIRUB SERPL-MCNC: 0.4 MG/DL (ref 0–1.2)
BUN SERPL-MCNC: 22 MG/DL (ref 6–23)
CALCIUM SERPL-MCNC: 9.2 MG/DL (ref 8.6–10.3)
CHLORIDE SERPL-SCNC: 107 MMOL/L (ref 98–107)
CO2 SERPL-SCNC: 24 MMOL/L (ref 21–32)
CREAT SERPL-MCNC: 1.22 MG/DL (ref 0.5–1.05)
EGFRCR SERPLBLD CKD-EPI 2021: 43 ML/MIN/1.73M*2
ERYTHROCYTE [DISTWIDTH] IN BLOOD BY AUTOMATED COUNT: 12.8 % (ref 11.5–14.5)
GLUCOSE BLD MANUAL STRIP-MCNC: 102 MG/DL (ref 74–99)
GLUCOSE BLD MANUAL STRIP-MCNC: 127 MG/DL (ref 74–99)
GLUCOSE BLD MANUAL STRIP-MCNC: 174 MG/DL (ref 74–99)
GLUCOSE SERPL-MCNC: 114 MG/DL (ref 74–99)
HCT VFR BLD AUTO: 30.7 % (ref 36–46)
HGB BLD-MCNC: 9.7 G/DL (ref 12–16)
MAGNESIUM SERPL-MCNC: 1.84 MG/DL (ref 1.6–2.4)
MCH RBC QN AUTO: 29.9 PG (ref 26–34)
MCHC RBC AUTO-ENTMCNC: 31.6 G/DL (ref 32–36)
MCV RBC AUTO: 95 FL (ref 80–100)
NRBC BLD-RTO: 0 /100 WBCS (ref 0–0)
PHOSPHATE SERPL-MCNC: 2.5 MG/DL (ref 2.5–4.9)
PLATELET # BLD AUTO: 257 X10*3/UL (ref 150–450)
POTASSIUM SERPL-SCNC: 3.9 MMOL/L (ref 3.5–5.3)
PROT SERPL-MCNC: 6.4 G/DL (ref 6.4–8.2)
RBC # BLD AUTO: 3.24 X10*6/UL (ref 4–5.2)
SODIUM SERPL-SCNC: 139 MMOL/L (ref 136–145)
WBC # BLD AUTO: 6.1 X10*3/UL (ref 4.4–11.3)

## 2025-01-15 PROCEDURE — 84100 ASSAY OF PHOSPHORUS: CPT

## 2025-01-15 PROCEDURE — 36415 COLL VENOUS BLD VENIPUNCTURE: CPT

## 2025-01-15 PROCEDURE — 2500000001 HC RX 250 WO HCPCS SELF ADMINISTERED DRUGS (ALT 637 FOR MEDICARE OP)

## 2025-01-15 PROCEDURE — 85027 COMPLETE CBC AUTOMATED: CPT

## 2025-01-15 PROCEDURE — 2500000002 HC RX 250 W HCPCS SELF ADMINISTERED DRUGS (ALT 637 FOR MEDICARE OP, ALT 636 FOR OP/ED)

## 2025-01-15 PROCEDURE — 99238 HOSP IP/OBS DSCHRG MGMT 30/<: CPT

## 2025-01-15 PROCEDURE — 80053 COMPREHEN METABOLIC PANEL: CPT

## 2025-01-15 PROCEDURE — 2500000004 HC RX 250 GENERAL PHARMACY W/ HCPCS (ALT 636 FOR OP/ED)

## 2025-01-15 PROCEDURE — 83735 ASSAY OF MAGNESIUM: CPT

## 2025-01-15 PROCEDURE — 2500000001 HC RX 250 WO HCPCS SELF ADMINISTERED DRUGS (ALT 637 FOR MEDICARE OP): Performed by: INTERNAL MEDICINE

## 2025-01-15 PROCEDURE — 82947 ASSAY GLUCOSE BLOOD QUANT: CPT

## 2025-01-15 RX ORDER — AMOXICILLIN AND CLAVULANATE POTASSIUM 875; 125 MG/1; MG/1
1 TABLET, FILM COATED ORAL 2 TIMES DAILY
Qty: 7 TABLET | Refills: 0 | Status: SHIPPED | OUTPATIENT
Start: 2025-01-15 | End: 2025-01-19

## 2025-01-15 RX ORDER — AMOXICILLIN AND CLAVULANATE POTASSIUM 875; 125 MG/1; MG/1
1 TABLET, FILM COATED ORAL 2 TIMES DAILY
Qty: 7 TABLET | Refills: 0 | Status: CANCELLED | OUTPATIENT
Start: 2025-01-15 | End: 2025-01-19

## 2025-01-15 RX ADMIN — SODIUM CHLORIDE, SODIUM LACTATE, POTASSIUM CHLORIDE, AND CALCIUM CHLORIDE 1000 ML: 600; 310; 30; 20 INJECTION, SOLUTION INTRAVENOUS at 08:49

## 2025-01-15 RX ADMIN — Medication 1 TABLET: at 08:31

## 2025-01-15 RX ADMIN — AMOXICILLIN AND CLAVULANATE POTASSIUM 1 TABLET: 875; 125 TABLET, FILM COATED ORAL at 08:31

## 2025-01-15 RX ADMIN — FAMOTIDINE 20 MG: 20 TABLET, FILM COATED ORAL at 08:31

## 2025-01-15 RX ADMIN — Medication 1 APPLICATION: at 08:31

## 2025-01-15 RX ADMIN — METOPROLOL SUCCINATE 50 MG: 50 TABLET, EXTENDED RELEASE ORAL at 08:31

## 2025-01-15 RX ADMIN — APIXABAN 5 MG: 5 TABLET, FILM COATED ORAL at 08:31

## 2025-01-15 RX ADMIN — OXYCODONE HYDROCHLORIDE 5 MG: 5 TABLET ORAL at 00:06

## 2025-01-15 RX ADMIN — GABAPENTIN 200 MG: 100 CAPSULE ORAL at 08:31

## 2025-01-15 RX ADMIN — INSULIN LISPRO 2 UNITS: 100 INJECTION, SOLUTION INTRAVENOUS; SUBCUTANEOUS at 12:09

## 2025-01-15 ASSESSMENT — PAIN SCALES - GENERAL
PAINLEVEL_OUTOF10: 7
PAINLEVEL_OUTOF10: 0 - NO PAIN
PAINLEVEL_OUTOF10: 2

## 2025-01-15 ASSESSMENT — PAIN DESCRIPTION - LOCATION: LOCATION: LEG

## 2025-01-15 ASSESSMENT — COGNITIVE AND FUNCTIONAL STATUS - GENERAL
TURNING FROM BACK TO SIDE WHILE IN FLAT BAD: A LITTLE
STANDING UP FROM CHAIR USING ARMS: A LOT
DRESSING REGULAR UPPER BODY CLOTHING: A LITTLE
MOVING FROM LYING ON BACK TO SITTING ON SIDE OF FLAT BED WITH BEDRAILS: A LITTLE
MOBILITY SCORE: 13
HELP NEEDED FOR BATHING: A LOT
DAILY ACTIVITIY SCORE: 16
DRESSING REGULAR LOWER BODY CLOTHING: A LOT
TOILETING: A LOT
MOVING TO AND FROM BED TO CHAIR: A LOT
PERSONAL GROOMING: A LITTLE
CLIMB 3 TO 5 STEPS WITH RAILING: TOTAL
WALKING IN HOSPITAL ROOM: A LOT

## 2025-01-15 ASSESSMENT — PAIN DESCRIPTION - DESCRIPTORS: DESCRIPTORS: JABBING

## 2025-01-15 ASSESSMENT — PAIN - FUNCTIONAL ASSESSMENT
PAIN_FUNCTIONAL_ASSESSMENT: 0-10
PAIN_FUNCTIONAL_ASSESSMENT: 0-10

## 2025-01-15 ASSESSMENT — PAIN DESCRIPTION - ORIENTATION: ORIENTATION: RIGHT;LEFT

## 2025-01-15 NOTE — DISCHARGE SUMMARY
Discharge Diagnosis  #acute metabolic encephalopathy, improving    #abdominal pain   #concern for choledocholithiasis / cholangitis    #hx LLE iliopsoas rupture   #concern for L psoas and iliacus hematoma  #acute anemia, baseline hgb is ~13, 9.7 on admission, improving    #BLE edema   #hx DVT     Issues Requiring Follow-Up  - The following recommendations are made for you at time of hospital discharge:   - You were prescribed Augmentin twice daily upon discharge for 4 days. You had your morning dose today, please remember to take your evening dose today. Starting tomorrow take twice daily for 3 additional days.   - You were also seen by gastroenterology during your hospital stay, please follow up with Dr. Marcano, who is with [facility] prior to having your EUS/ERCP scheduled  - Additionally, please remember to complete outpatient EUS/ERCP.  - Please take your home medications as instructed prior to hospitalization.   - No changes to your home medications have been made during your hospital stay.   - Please follow-up with your primary care provider within 5-7 days from time of discharge. Please call your PCP's office to schedule an appointment. Likely will need to bring photo ID and insurance card to your appointment.   - If you experience any worsening symptoms or any new acute concerns arise, please contact your primary care provider to discuss and possibly arrange an appointment. If you cannot get in touch with provider or severe symptoms are present, please return to nearest emergency room/urgent care for evaluation and treatment.    Discharge Meds     Medication List      START taking these medications     amoxicillin-pot clavulanate 875-125 mg tablet; Commonly known as:   Augmentin; Take 1 tablet by mouth 2 times a day for 4 days.     CONTINUE taking these medications     acetaminophen 500 mg capsule; Commonly known as: Tylenol   Eliquis 5 mg tablet; Generic drug: apixaban   gabapentin 300 mg capsule; Commonly  known as: Neurontin   lovastatin 20 mg tablet; Commonly known as: Mevacor   metFORMIN 500 mg tablet; Commonly known as: Glucophage   metoprolol succinate XL 50 mg 24 hr tablet; Commonly known as: Toprol-XL   Ozempic 0.25 mg or 0.5 mg (2 mg/3 mL) pen injector; Generic drug:   semaglutide   valsartan-hydrochlorothiazide 160-12.5 mg tablet; Commonly known as:   Diovan-HCT       Test Results Pending At Discharge  Pending Labs       No current pending labs.            Hospital Course  Brief HPI:    84 y.o. female with PMHX DVT on Eliquis, hx R breast ca s/p lumpectomy, CKD3a, dementia baseline A&O x3, chronic low back pain, HTN, HLD, T2DM, left iliopsoas tendon rupture who presents from [facility] for metabolic encephalopathy, admitted for suspected abdominal infection and iliacus hematoma.   ED Course:    In the ED vitals 97.5f, 85 hr, 16 rr, 136/71, 98% on room air.   Labs 139 sodium, 31 bicarb, 1.35 cr (baseline around 1.05), lfts wnl, 192 ck, 16 ammonia, 1.5 lactate, 51 bnp, 12 trop, 198 glucose.   Wbc 6.8, 9.7 hgb (baseline 13), 222 plt. 2x blood cultures obtained. Ua without signs of infection.     Ekg sinus 86 466 qtcb RBBB, first degree av block, left anterior fascicular block. No acute st elevation.   CT head no acute process. CXR no acute process.     Floors:  Upon admission, a RUQ and MRCP was ordered for c/f of choledocholithiasis/cholangitis. The RUQ US showed dilated common bile duct 1 cm and partially visualized dilated main pancreatic duct 5 mm. The MRCP revealed the main pancreatic duct dilation 7 mm and CBD dilation extending down to ampulla level. C/f ampullary stenosis or occult obstruction ampullary lesion. Additionally, GI was consulted and recommend outpatient follow up with [facility] along with outpatient EUS/ERCP. Pt was treated with Zosyn for concern of cholangitis and transitioned to Augmentin upon discharge. For pt's LLE iliopsoas rupture, Eliquis was initially and orthopedic  surgery was consulted. They recommended no surgical intervention at this point and Eliquis was resumed. . During her stay, pt was found to be in an NATALIE, presumed 2/2 dehydration. She was provided with fluids after which creatinine returned to baseline. Wound care was placed on consult for RLE wound. It did not appear acutely infected so it was monitored. Pt was ultimately medically stable and cleared for discharged with recommendations, to complete antibiotics course and follow with Dr. Marcano, [facility] prior to completing EUS/ERCP.     Pertinent Physical Exam At Time of Discharge  constitutional: Appears stated age, obese.   HEENT: EOMI, clear sclera  CV: RRR, No M/R/G  PULM: CTAB, no coughing or wheezing  ABDOMEN: Soft, NT/ND. No TTP  SKIN: Normal Color, Warm, Dry. Rash covered by bandage on R leg   EXTREMITIES: Non-Tender, Full ROM.   NEURO: A&O x 3  PSYCH: Normal Mood & Behavior    Outpatient Follow-Up  Future Appointments   Date Time Provider Department Center   1/21/2025  2:45 PM [facility] 1 JAZZ Lyle DO  Internal Medicine, PGY-1

## 2025-01-15 NOTE — PROGRESS NOTES
Donna J Gonda is a 84 y.o. female on day 4 of admission presenting with Altered mental status, unspecified altered mental status type.    Subjective   Interval History: no fever, no new complaints        Review of Systems    Objective   Range of Vitals (last 24 hours)  Heart Rate:  [87-96]   Temp:  [36.4 °C (97.5 °F)-37 °C (98.6 °F)]   Resp:  [18]   BP: (100-149)/(53-82)   SpO2:  [97 %-98 %]   Daily Weight  01/14/25 : 83.9 kg (184 lb 14.4 oz)    Body mass index is 33.81 kg/m².    Physical Exam  Constitutional:       Appearance: Normal appearance.   HENT:      Head: Normocephalic and atraumatic.      Mouth/Throat:      Mouth: Mucous membranes are moist.      Pharynx: Oropharynx is clear.   Eyes:      Pupils: Pupils are equal, round, and reactive to light.   Cardiovascular:      Rate and Rhythm: Normal rate and regular rhythm.      Heart sounds: Normal heart sounds.   Pulmonary:      Effort: Pulmonary effort is normal.      Breath sounds: Normal breath sounds.   Abdominal:      General: Abdomen is flat. Bowel sounds are normal.      Palpations: Abdomen is soft.   Musculoskeletal:         General: Swelling present.      Cervical back: Normal range of motion.      Comments: stasis   Neurological:      Mental Status: She is alert.         Antibiotics  amoxicillin-pot clavulanate - 875-125 mg    Relevant Results  Labs  Results from last 72 hours   Lab Units 01/15/25  0744 01/14/25  0716 01/13/25  0717   WBC AUTO x10*3/uL 6.1 7.2 6.6   HEMOGLOBIN g/dL 9.7* 10.2* 9.6*   HEMATOCRIT % 30.7* 32.8* 30.2*   PLATELETS AUTO x10*3/uL 257 227 243     Results from last 72 hours   Lab Units 01/15/25  0744 01/14/25  1257 01/14/25  0716   SODIUM mmol/L 139 138 139   POTASSIUM mmol/L 3.9 4.2 4.0   CHLORIDE mmol/L 107 104 105   CO2 mmol/L 24 25 24   BUN mg/dL 22 20 18   CREATININE mg/dL 1.22* 1.28* 1.42*   GLUCOSE mg/dL 114* 144* 113*   CALCIUM mg/dL 9.2 8.8 9.1   ANION GAP mmol/L 12 13 14   EGFR mL/min/1.73m*2 43* 41* 36*   PHOSPHORUS  mg/dL 2.5 3.0 3.0     Results from last 72 hours   Lab Units 01/15/25  0744 01/14/25  1257 01/14/25  0716 01/13/25  0717   ALK PHOS U/L 44  --  46 47   BILIRUBIN TOTAL mg/dL 0.4  --  0.6 0.6   PROTEIN TOTAL g/dL 6.4  --  6.4 6.1*   ALT U/L 13  --  14 14   AST U/L 17  --  19 19   ALBUMIN g/dL 3.0* 3.1* 3.1* 3.1*     Estimated Creatinine Clearance: 33.2 mL/min (A) (by C-G formula based on SCr of 1.22 mg/dL (H)).  C-Reactive Protein   Date Value Ref Range Status   01/11/2025 6.96 (H) <1.00 mg/dL Final   01/11/2025 8.29 (H) <1.00 mg/dL Final     Microbiology  Reviewed  Imaging  Reviewed        Assessment/Plan   Encephalopathy, likely metabolic, resolved  Dilatated biliary tree, LFT are N, MRI with dilated biliary / pancreatic ducts, no obvious lesions  Ruptured Lt iliopsoas tendon      Recommendations :  Plan on 5 days of oral Augmentin with discharge   Up in the chair     I spent minutes in the professional and overall care of this patient.      Radha Dash MD

## 2025-01-15 NOTE — DISCHARGE INSTRUCTIONS
- The following recommendations are made for you at time of hospital discharge:   - You were prescribed Augmentin twice daily upon discharge for 4 days. You had your morning dose today, please remember to take your evening dose today. Starting tomorrow take twice daily for 3 additional days.   - You were also seen by gastroenterology during your hospital stay, please follow up with Dr. Marcano, who is with [facility] prior to having your EUS/ERCP scheduled  - Additionally, please remember to complete outpatient EUS/ERCP.  - Please take your home medications as instructed prior to hospitalization.   - No changes to your home medications have been made during your hospital stay.   - Please follow-up with your primary care provider within 5-7 days from time of discharge. Please call your PCP's office to schedule an appointment. Likely will need to bring photo ID and insurance card to your appointment.   - If you experience any worsening symptoms or any new acute concerns arise, please contact your primary care provider to discuss and possibly arrange an appointment. If you cannot get in touch with provider or severe symptoms are present, please return to nearest emergency room/urgent care for evaluation and treatment.

## 2025-01-15 NOTE — CARE PLAN
The patient's goals for the shift include      The clinical goals for the shift include Patient will report decreased pain, remain free from injury, and rest comfortably this shift

## 2025-01-15 NOTE — PROGRESS NOTES
01/15/25 1310   Discharge Planning   Living Arrangements Other (Comment)   Support Systems Religion/charles community   Assistance Needed WC/ walker/ ADL assist. From [facility].   Type of Residence Nursing home/residential care   Who is requesting discharge planning? Provider   Home or Post Acute Services Post acute facilities (Rehab/SNF/etc)   Type of Post Acute Facility Services Skilled nursing   Expected Discharge Disposition SNF  (return to [facility])   Does the patient need discharge transport arranged? Yes   RoundTrip coordination needed? Yes   Has discharge transport been arranged? Yes   What day is the transport expected? 01/15/25   What time is the transport expected? 1330   Intensity of Service   Intensity of Service 0-30 min

## 2025-01-15 NOTE — NURSING NOTE
Assumed care of patient.     N.O. for 1L of LR.    Patient to discharge. This nurse removed IV. Belongings packed by this nurse.     Attempted to call report, Received voicemail.  IZZY DENISE, PERCY

## 2025-01-20 ENCOUNTER — LAB REQUISITION (OUTPATIENT)
Dept: LAB | Facility: HOSPITAL | Age: 85
End: 2025-01-20
Payer: MEDICARE

## 2025-01-20 DIAGNOSIS — I10 ESSENTIAL (PRIMARY) HYPERTENSION: ICD-10-CM

## 2025-01-20 LAB
ANION GAP SERPL CALC-SCNC: 12 MMOL/L (ref 10–20)
ATRIAL RATE: 86 BPM
BUN SERPL-MCNC: 22 MG/DL (ref 6–23)
CALCIUM SERPL-MCNC: 9.8 MG/DL (ref 8.6–10.3)
CHLORIDE SERPL-SCNC: 103 MMOL/L (ref 98–107)
CO2 SERPL-SCNC: 29 MMOL/L (ref 21–32)
CREAT SERPL-MCNC: 1.16 MG/DL (ref 0.5–1.05)
EGFRCR SERPLBLD CKD-EPI 2021: 46 ML/MIN/1.73M*2
GLUCOSE SERPL-MCNC: 130 MG/DL (ref 74–99)
P AXIS: 51 DEGREES
P OFFSET: 141 MS
P ONSET: 86 MS
POTASSIUM SERPL-SCNC: 4.5 MMOL/L (ref 3.5–5.3)
PR INTERVAL: 240 MS
Q ONSET: 206 MS
QRS COUNT: 14 BEATS
QRS DURATION: 122 MS
QT INTERVAL: 390 MS
QTC CALCULATION(BAZETT): 466 MS
QTC FREDERICIA: 440 MS
R AXIS: -68 DEGREES
SODIUM SERPL-SCNC: 139 MMOL/L (ref 136–145)
T AXIS: 90 DEGREES
T OFFSET: 401 MS
VENTRICULAR RATE: 86 BPM

## 2025-01-20 PROCEDURE — 80048 BASIC METABOLIC PNL TOTAL CA: CPT | Mod: OUT | Performed by: REGISTERED NURSE

## 2025-01-21 ENCOUNTER — HOSPITAL ENCOUNTER (OUTPATIENT)
Dept: RADIOLOGY | Facility: HOSPITAL | Age: 85
Discharge: HOME | End: 2025-01-21
Payer: MEDICARE

## 2025-01-21 DIAGNOSIS — M25.562 PAIN IN LEFT KNEE: ICD-10-CM

## 2025-01-21 PROCEDURE — 73721 MRI JNT OF LWR EXTRE W/O DYE: CPT | Mod: LT

## 2025-01-21 PROCEDURE — 73721 MRI JNT OF LWR EXTRE W/O DYE: CPT | Mod: LEFT SIDE | Performed by: STUDENT IN AN ORGANIZED HEALTH CARE EDUCATION/TRAINING PROGRAM

## 2025-02-03 ENCOUNTER — LAB REQUISITION (OUTPATIENT)
Dept: LAB | Facility: HOSPITAL | Age: 85
End: 2025-02-03
Payer: MEDICARE

## 2025-02-03 DIAGNOSIS — N39.0 URINARY TRACT INFECTION, SITE NOT SPECIFIED: ICD-10-CM

## 2025-02-03 LAB
APPEARANCE UR: ABNORMAL
BILIRUB UR STRIP.AUTO-MCNC: NEGATIVE MG/DL
COLOR UR: ABNORMAL
GLUCOSE UR STRIP.AUTO-MCNC: NORMAL MG/DL
KETONES UR STRIP.AUTO-MCNC: NEGATIVE MG/DL
LEUKOCYTE ESTERASE UR QL STRIP.AUTO: ABNORMAL
MUCOUS THREADS #/AREA URNS AUTO: ABNORMAL /LPF
NITRITE UR QL STRIP.AUTO: NEGATIVE
PH UR STRIP.AUTO: 5.5 [PH]
PROT UR STRIP.AUTO-MCNC: ABNORMAL MG/DL
RBC # UR STRIP.AUTO: ABNORMAL /UL
RBC #/AREA URNS AUTO: >20 /HPF
SP GR UR STRIP.AUTO: 1.02
UROBILINOGEN UR STRIP.AUTO-MCNC: NORMAL MG/DL
WBC #/AREA URNS AUTO: >50 /HPF
WBC CLUMPS #/AREA URNS AUTO: ABNORMAL /HPF

## 2025-02-03 PROCEDURE — 87086 URINE CULTURE/COLONY COUNT: CPT | Mod: OUT,GEALAB | Performed by: REGISTERED NURSE

## 2025-02-03 PROCEDURE — 87077 CULTURE AEROBIC IDENTIFY: CPT | Mod: OUT,GEALAB | Performed by: REGISTERED NURSE

## 2025-02-03 PROCEDURE — 81001 URINALYSIS AUTO W/SCOPE: CPT | Mod: OUT | Performed by: REGISTERED NURSE

## 2025-02-04 ENCOUNTER — APPOINTMENT (OUTPATIENT)
Dept: GASTROENTEROLOGY | Facility: CLINIC | Age: 85
End: 2025-02-04
Payer: MEDICARE

## 2025-02-05 LAB — BACTERIA UR CULT: ABNORMAL

## 2025-02-20 ENCOUNTER — APPOINTMENT (OUTPATIENT)
Dept: WOUND CARE | Facility: HOSPITAL | Age: 85
End: 2025-02-20
Payer: MEDICARE

## 2025-02-25 ENCOUNTER — OFFICE VISIT (OUTPATIENT)
Dept: WOUND CARE | Facility: HOSPITAL | Age: 85
End: 2025-02-25
Payer: MEDICARE

## 2025-02-25 ENCOUNTER — APPOINTMENT (OUTPATIENT)
Dept: VASCULAR MEDICINE | Facility: HOSPITAL | Age: 85
End: 2025-02-25
Payer: MEDICARE

## 2025-02-25 DIAGNOSIS — I73.89 OTHER SPECIFIED PERIPHERAL VASCULAR DISEASES: Primary | ICD-10-CM

## 2025-02-25 PROCEDURE — 11042 DBRDMT SUBQ TIS 1ST 20SQCM/<: CPT | Mod: 50

## 2025-02-25 PROCEDURE — 99213 OFFICE O/P EST LOW 20 MIN: CPT | Mod: 25

## 2025-03-06 ENCOUNTER — HOSPITAL ENCOUNTER (OUTPATIENT)
Dept: VASCULAR MEDICINE | Facility: HOSPITAL | Age: 85
Discharge: HOME | End: 2025-03-06
Payer: MEDICARE

## 2025-03-06 ENCOUNTER — OFFICE VISIT (OUTPATIENT)
Dept: WOUND CARE | Facility: HOSPITAL | Age: 85
End: 2025-03-06
Payer: MEDICARE

## 2025-03-06 ENCOUNTER — OFFICE VISIT (OUTPATIENT)
Dept: CARDIOLOGY | Facility: HOSPITAL | Age: 85
End: 2025-03-06
Payer: MEDICARE

## 2025-03-06 VITALS
OXYGEN SATURATION: 98 % | WEIGHT: 184 LBS | HEART RATE: 94 BPM | SYSTOLIC BLOOD PRESSURE: 127 MMHG | DIASTOLIC BLOOD PRESSURE: 82 MMHG | BODY MASS INDEX: 33.86 KG/M2

## 2025-03-06 DIAGNOSIS — I73.89 OTHER SPECIFIED PERIPHERAL VASCULAR DISEASES: Primary | ICD-10-CM

## 2025-03-06 DIAGNOSIS — L89.629 PRESSURE INJURY OF SKIN OF LEFT HEEL, UNSPECIFIED INJURY STAGE: ICD-10-CM

## 2025-03-06 DIAGNOSIS — I73.89 OTHER SPECIFIED PERIPHERAL VASCULAR DISEASES: ICD-10-CM

## 2025-03-06 PROCEDURE — 1159F MED LIST DOCD IN RCRD: CPT | Performed by: PHYSICIAN ASSISTANT

## 2025-03-06 PROCEDURE — 93922 UPR/L XTREMITY ART 2 LEVELS: CPT

## 2025-03-06 PROCEDURE — 99204 OFFICE O/P NEW MOD 45 MIN: CPT | Performed by: PHYSICIAN ASSISTANT

## 2025-03-06 PROCEDURE — 93922 UPR/L XTREMITY ART 2 LEVELS: CPT | Performed by: SURGERY

## 2025-03-06 PROCEDURE — 11042 DBRDMT SUBQ TIS 1ST 20SQCM/<: CPT | Mod: LT

## 2025-03-06 PROCEDURE — 99214 OFFICE O/P EST MOD 30 MIN: CPT | Performed by: PHYSICIAN ASSISTANT

## 2025-03-06 NOTE — PROGRESS NOTES
Referred by Dr. Pitt for No chief complaint on file.     History Of Present Illness:    Donna J Gonda is a 84 y.o. female presenting to establish care. She presents for L heel pressure ulcer. Patient reports that she is wheelchair bound at baseline, transfers with a hossein lift. Reports some pain in her heel. Denies claudication, rest pain.    Past Medical History:  She has no past medical history on file.    Past Surgical History:  She has no past surgical history on file.      Social History:  She reports that she has never smoked. She has never used smokeless tobacco. She reports that she does not drink alcohol and does not use drugs.    Family History:  No family history on file.     Allergies:  Patient has no known allergies.    Outpatient Medications:  Current Outpatient Medications   Medication Instructions    acetaminophen (Tylenol) 500 mg capsule Every 8 hours PRN    Eliquis 5 mg, 2 times daily    gabapentin (Neurontin) 300 mg capsule 1 capsule, Every 12 hours    lovastatin (MEVACOR) 20 mg, Daily    metFORMIN (Glucophage) 500 mg tablet Take 1 tablet (500 mg) by mouth once daily at bedtime.    metoprolol succinate XL (Toprol-XL) 50 mg 24 hr tablet     Ozempic 0.25 mg or 0.5 mg (2 mg/3 mL) pen injector     valsartan-hydrochlorothiazide (Diovan-HCT) 160-12.5 mg tablet         Last Recorded Vitals:  There were no vitals filed for this visit.    Physical Exam:  Physical Exam  Constitutional:       General: She is not in acute distress.     Appearance: She is obese.   HENT:      Head: Normocephalic.      Nose: Nose normal.      Mouth/Throat:      Mouth: Mucous membranes are moist.   Eyes:      Conjunctiva/sclera: Conjunctivae normal.   Cardiovascular:      Rate and Rhythm: Normal rate.      Pulses: Normal pulses.      Heart sounds: No murmur heard.  Pulmonary:      Effort: Pulmonary effort is normal.   Abdominal:      General: There is no distension.      Palpations: Abdomen is soft.      Tenderness: There is  "no abdominal tenderness.   Musculoskeletal:         General: No swelling.      Cervical back: Neck supple.      Right lower leg: No edema.      Left lower leg: No edema.   Skin:     General: Skin is warm.   Neurological:      General: No focal deficit present.      Mental Status: She is alert. Mental status is at baseline.   Psychiatric:         Mood and Affect: Mood normal.         Behavior: Behavior normal.            Last Labs:  CBC -  Lab Results   Component Value Date    WBC 6.1 01/15/2025    HGB 9.7 (L) 01/15/2025    HCT 30.7 (L) 01/15/2025    MCV 95 01/15/2025     01/15/2025       CMP -  Lab Results   Component Value Date    CALCIUM 9.8 01/20/2025    PHOS 2.5 01/15/2025    PROT 6.4 01/15/2025    ALBUMIN 3.0 (L) 01/15/2025    AST 17 01/15/2025    ALT 13 01/15/2025    ALKPHOS 44 01/15/2025    BILITOT 0.4 01/15/2025       LIPID PANEL -   Lab Results   Component Value Date    CHOL 127 01/02/2025    TRIG 78 01/02/2025    HDL 58.3 01/02/2025    CHHDL 2.2 01/02/2025    LDLF 63 02/28/2022    VLDL 16 01/02/2025    NHDL 69 01/02/2025       RENAL FUNCTION PANEL -   Lab Results   Component Value Date    GLUCOSE 130 (H) 01/20/2025     01/20/2025    K 4.5 01/20/2025     01/20/2025    CO2 29 01/20/2025    ANIONGAP 12 01/20/2025    BUN 22 01/20/2025    CREATININE 1.16 (H) 01/20/2025    CALCIUM 9.8 01/20/2025    PHOS 2.5 01/15/2025    ALBUMIN 3.0 (L) 01/15/2025        Lab Results   Component Value Date    BNP 51 01/11/2025    HGBA1C 6.9 (H) 08/28/2024       Last Cardiology Tests:  ECG:  ECG 12 Lead 01/11/2025    Echo:  No results found for this or any previous visit from the past 1095 days.    Ejection Fractions:  No results found for: \"EF\"    Cath:  No results found for this or any previous visit from the past 1095 days.    Stress Test:  No results found for this or any previous visit from the past 1095 days.    Imaging:  Vascular US PREET (03/06/2025):  CONCLUSIONS:  Right Lower PVR: No evidence of arterial " occlusive disease in the right lower extremity at rest. Right pressures of >220 mmHg suggest no compressibility of vessels and may make absolute Segmental Limb Pressures (SLP) unreliable. Normal digital perfusion noted. Multiphasic flow is noted in the right common femoral artery, right posterior tibial artery and right dorsalis pedis artery. No right brachial pressure obtained due to mediport placement.  Left Lower PVR: Evidence of mild arterial occlusive disease in the left lower extremity at rest. Left pressures of >220 mmHg suggest no compressibility of vessels and may make absolute Segmental Limb Pressures (SLP) unreliable. Decreased digital perfusion noted. Multiphasic flow is noted in the left common femoral artery, left posterior tibial artery and left dorsalis pedis artery. Severity of disease called by tracings due to partially non-compressible vessels.     Imaging & Doppler Findings:     RIGHT Lower PVR                Pressures Ratios  Right Posterior Tibial (Ankle) 239 mmHg  1.71  Right Dorsalis Pedis (Ankle)   224 mmHg  1.60  Right Digit (Great Toe)        88 mmHg   0.63           LEFT Lower PVR                Pressures Ratios  Left Posterior Tibial (Ankle) 230 mmHg  1.64  Left Dorsalis Pedis (Ankle)   232 mmHg  1.66  Left Digit (Great Toe)        71 mmHg   0.51    Lab review: I have Chemistry BMP   Lab Results   Component Value Date    GLUCOSE 130 (H) 01/20/2025    CALCIUM 9.8 01/20/2025    CO2 29 01/20/2025    CREATININE 1.16 (H) 01/20/2025   , CBC:  Lab Results   Component Value Date    WBC 6.1 01/15/2025    RBC 3.24 (L) 01/15/2025    HGB 9.7 (L) 01/15/2025    HCT 30.7 (L) 01/15/2025    MCV 95 01/15/2025    MCH 29.9 01/15/2025    MCHC 31.6 (L) 01/15/2025    RDW 12.8 01/15/2025    NRBC 0.0 01/15/2025   , Coags:   Lab Results   Component Value Date    INR 1.3 (H) 02/03/2023   , and Lipids:   Lab Results   Component Value Date    CHOL 127 01/02/2025    HDL 58.3 01/02/2025    LDLCALC 53 01/02/2025     TRIG 78 01/02/2025       Imaging review: I have  personally reviewed the result(s) PVR/PREET.     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     L heel pressure ulcer  HTN  HLD    PREET reviewed with multiphasic flow b/l. Noncompressible vessels on PREET.   Patient following with wound care.   Recommend ongoing wound management, no evidence of peripheral arterial disease that would inhibit healing.   Continue with lovastatin for mgmt of HLD.  RTC 2 mos to ensure improvement/resolution.         Amie Montoya PA-C

## 2025-03-13 ENCOUNTER — OFFICE VISIT (OUTPATIENT)
Dept: WOUND CARE | Facility: HOSPITAL | Age: 85
End: 2025-03-13
Payer: MEDICARE

## 2025-03-13 PROCEDURE — 11042 DBRDMT SUBQ TIS 1ST 20SQCM/<: CPT | Mod: LT

## 2025-03-20 ENCOUNTER — OFFICE VISIT (OUTPATIENT)
Dept: WOUND CARE | Facility: HOSPITAL | Age: 85
End: 2025-03-20
Payer: MEDICARE

## 2025-03-20 PROCEDURE — 11042 DBRDMT SUBQ TIS 1ST 20SQCM/<: CPT | Mod: LT

## 2025-03-27 ENCOUNTER — OFFICE VISIT (OUTPATIENT)
Dept: WOUND CARE | Facility: HOSPITAL | Age: 85
End: 2025-03-27
Payer: MEDICARE

## 2025-03-27 PROCEDURE — 11042 DBRDMT SUBQ TIS 1ST 20SQCM/<: CPT | Mod: LT

## 2025-04-03 ENCOUNTER — OFFICE VISIT (OUTPATIENT)
Dept: WOUND CARE | Facility: HOSPITAL | Age: 85
End: 2025-04-03
Payer: MEDICARE

## 2025-04-03 PROCEDURE — 11042 DBRDMT SUBQ TIS 1ST 20SQCM/<: CPT | Mod: LT

## 2025-04-10 ENCOUNTER — OFFICE VISIT (OUTPATIENT)
Dept: WOUND CARE | Facility: HOSPITAL | Age: 85
End: 2025-04-10
Payer: MEDICARE

## 2025-04-10 PROCEDURE — 11042 DBRDMT SUBQ TIS 1ST 20SQCM/<: CPT | Mod: LT

## 2025-04-17 ENCOUNTER — OFFICE VISIT (OUTPATIENT)
Dept: WOUND CARE | Facility: HOSPITAL | Age: 85
End: 2025-04-17
Payer: MEDICARE

## 2025-04-17 PROCEDURE — 11042 DBRDMT SUBQ TIS 1ST 20SQCM/<: CPT | Mod: LT

## 2025-04-24 ENCOUNTER — OFFICE VISIT (OUTPATIENT)
Dept: WOUND CARE | Facility: HOSPITAL | Age: 85
End: 2025-04-24
Payer: MEDICARE

## 2025-04-24 PROCEDURE — 11042 DBRDMT SUBQ TIS 1ST 20SQCM/<: CPT | Mod: LT

## 2025-05-01 ENCOUNTER — APPOINTMENT (OUTPATIENT)
Dept: CARDIOLOGY | Facility: HOSPITAL | Age: 85
End: 2025-05-01
Payer: MEDICARE

## 2025-05-08 ENCOUNTER — OFFICE VISIT (OUTPATIENT)
Dept: WOUND CARE | Facility: HOSPITAL | Age: 85
End: 2025-05-08
Payer: MEDICARE

## 2025-05-08 PROCEDURE — 11042 DBRDMT SUBQ TIS 1ST 20SQCM/<: CPT | Mod: LT

## 2025-05-15 ENCOUNTER — OFFICE VISIT (OUTPATIENT)
Dept: WOUND CARE | Facility: HOSPITAL | Age: 85
End: 2025-05-15
Payer: MEDICARE

## 2025-05-15 PROCEDURE — 99213 OFFICE O/P EST LOW 20 MIN: CPT

## 2025-05-29 ENCOUNTER — OFFICE VISIT (OUTPATIENT)
Dept: WOUND CARE | Facility: HOSPITAL | Age: 85
End: 2025-05-29
Payer: MEDICARE

## 2025-05-29 PROCEDURE — 99212 OFFICE O/P EST SF 10 MIN: CPT

## 2025-08-08 ENCOUNTER — LAB REQUISITION (OUTPATIENT)
Dept: LAB | Facility: HOSPITAL | Age: 85
End: 2025-08-08
Payer: MEDICARE

## 2025-08-08 DIAGNOSIS — E10.9 TYPE 1 DIABETES MELLITUS WITHOUT COMPLICATIONS: ICD-10-CM

## 2025-08-08 LAB
EST. AVERAGE GLUCOSE BLD GHB EST-MCNC: 131 MG/DL
HBA1C MFR BLD: 6.2 % (ref ?–5.7)

## 2025-08-08 PROCEDURE — 83036 HEMOGLOBIN GLYCOSYLATED A1C: CPT | Mod: OUT,GEALAB | Performed by: REGISTERED NURSE
